# Patient Record
Sex: FEMALE | Race: BLACK OR AFRICAN AMERICAN | Employment: FULL TIME | URBAN - METROPOLITAN AREA
[De-identification: names, ages, dates, MRNs, and addresses within clinical notes are randomized per-mention and may not be internally consistent; named-entity substitution may affect disease eponyms.]

---

## 2017-04-28 RX ORDER — AMLODIPINE BESYLATE 5 MG/1
5 TABLET ORAL
Qty: 30 TABLET | Refills: 0 | Status: SHIPPED | OUTPATIENT
Start: 2017-04-28 | End: 2017-05-30

## 2017-04-28 RX ORDER — LOSARTAN POTASSIUM 25 MG/1
25 TABLET ORAL
Qty: 30 TABLET | Refills: 0 | Status: SHIPPED | OUTPATIENT
Start: 2017-04-28 | End: 2017-06-03

## 2017-04-28 NOTE — TELEPHONE ENCOUNTER
Please advise on refill request.  Unable to refill per protocol.    Hypertensive Medications  Protocol Criteria:  · Appointment scheduled in the past 6 months or in the next 3 months  · BMP or CMP in the past 12 months  · Creatinine result < 2  Recent Visit

## 2017-05-30 RX ORDER — AMLODIPINE BESYLATE 5 MG/1
5 TABLET ORAL
Qty: 30 TABLET | Refills: 1 | Status: SHIPPED | OUTPATIENT
Start: 2017-05-30 | End: 2017-05-31

## 2017-05-31 RX ORDER — AMLODIPINE BESYLATE 5 MG/1
TABLET ORAL
Qty: 90 TABLET | Refills: 1 | Status: SHIPPED | OUTPATIENT
Start: 2017-05-31 | End: 2018-07-05

## 2017-06-03 RX ORDER — LOSARTAN POTASSIUM 25 MG/1
25 TABLET ORAL
Qty: 30 TABLET | Refills: 5 | Status: SHIPPED | OUTPATIENT
Start: 2017-06-03 | End: 2018-02-15

## 2017-06-03 NOTE — TELEPHONE ENCOUNTER
Refill protocol failed, labs and appt out of range.   Follow up scheduled 07/20/17, JSK please advise on refill request.    Hypertensive Medications  Protocol Criteria:  · Appointment scheduled in the past 6 months or in the next 3 months  · BMP or CMP in t

## 2017-07-20 ENCOUNTER — OFFICE VISIT (OUTPATIENT)
Dept: INTERNAL MEDICINE CLINIC | Facility: CLINIC | Age: 47
End: 2017-07-20

## 2017-07-20 ENCOUNTER — OFFICE VISIT (OUTPATIENT)
Dept: OBGYN CLINIC | Facility: CLINIC | Age: 47
End: 2017-07-20

## 2017-07-20 VITALS
TEMPERATURE: 98 F | HEIGHT: 64 IN | RESPIRATION RATE: 20 BRPM | SYSTOLIC BLOOD PRESSURE: 148 MMHG | BODY MASS INDEX: 43.51 KG/M2 | HEART RATE: 78 BPM | DIASTOLIC BLOOD PRESSURE: 94 MMHG | WEIGHT: 254.88 LBS

## 2017-07-20 VITALS
BODY MASS INDEX: 42.32 KG/M2 | DIASTOLIC BLOOD PRESSURE: 90 MMHG | HEIGHT: 65 IN | HEART RATE: 71 BPM | WEIGHT: 254 LBS | SYSTOLIC BLOOD PRESSURE: 155 MMHG

## 2017-07-20 DIAGNOSIS — M79.673 HEEL PAIN, UNSPECIFIED LATERALITY: ICD-10-CM

## 2017-07-20 DIAGNOSIS — Z01.411 ENCOUNTER FOR GYNECOLOGICAL EXAMINATION WITH ABNORMAL FINDING: ICD-10-CM

## 2017-07-20 DIAGNOSIS — I10 ESSENTIAL HYPERTENSION: Primary | ICD-10-CM

## 2017-07-20 DIAGNOSIS — Z12.31 SCREENING MAMMOGRAM, ENCOUNTER FOR: ICD-10-CM

## 2017-07-20 DIAGNOSIS — Z11.3 SCREEN FOR STD (SEXUALLY TRANSMITTED DISEASE): Primary | ICD-10-CM

## 2017-07-20 PROCEDURE — 99214 OFFICE O/P EST MOD 30 MIN: CPT | Performed by: INTERNAL MEDICINE

## 2017-07-20 PROCEDURE — 99396 PREV VISIT EST AGE 40-64: CPT | Performed by: ADVANCED PRACTICE MIDWIFE

## 2017-07-20 PROCEDURE — 99212 OFFICE O/P EST SF 10 MIN: CPT | Performed by: INTERNAL MEDICINE

## 2017-07-20 NOTE — PROGRESS NOTES
HPI:   Charla Anne is a 48year old female who presents for annual exam.  Sees Dr. Dali Hayden for primary care, management of HTN,  Pt denies complaints except that periods are becoming more irregular. Last one was in April.   Not sexually active, rece exertion  CARDIOVASCULAR: denies chest pain on exertion  GI: denies abdominal pain,denies heartburn, denies constipation or diarrhea  : denies dysuria, pelvic pain, vaginal discharge or discomfort; periods regular, denies abnormally heaving bleeding; den for contraception until menopause, and patient is abstinent  Offered patient STI screening and she accepts  Labs drawn:  GC/CT, CBC, CMP, TSH with reflex to FT4, Vitamin D, lipid panel, HIV, RPR, HepB    Lennox Welch CNM

## 2017-07-20 NOTE — PROGRESS NOTES
HPI:    Patient ID: Shashank Murry is a 48year old female. HPI  Patient here for follow-up on chronic medical issues as listed below. Last seen in February 2016. Weight is down a few pounds at that time.   Chronically elevated alkaline phosphatase hematuria, vaginal discharge and sexual dysfunction. Musculoskeletal: Negative for joint pain. Skin: Negative for rash. Neurological: Negative for weakness, numbness and headaches. Hematological: Does not bruise/bleed easily.    Psychiatric/Behavior blood work and contact patient with results.    (Z68.41) BMI 40.0-44.9, [facility]  Plan: Discussed the importance of long-term commitment to diet, exercise, weight loss.    (M29.653) Heel pain, unspecified laterality  Plan: Likely plantar fasciitis.   Con

## 2017-07-21 LAB
C TRACH DNA SPEC QL NAA+PROBE: NEGATIVE
N GONORRHOEA DNA SPEC QL NAA+PROBE: NEGATIVE

## 2017-07-22 ENCOUNTER — TELEPHONE (OUTPATIENT)
Dept: OBGYN CLINIC | Facility: CLINIC | Age: 47
End: 2017-07-22

## 2017-07-22 NOTE — TELEPHONE ENCOUNTER
Lm for pt notifying her of neg cx & reminding her that she still has outstanding lab work that needs to be drawn

## 2017-08-15 RX ORDER — AMLODIPINE BESYLATE 5 MG/1
TABLET ORAL
Qty: 30 TABLET | Refills: 5 | Status: SHIPPED | OUTPATIENT
Start: 2017-08-15 | End: 2018-05-26

## 2017-08-25 ENCOUNTER — HOSPITAL ENCOUNTER (OUTPATIENT)
Dept: MAMMOGRAPHY | Facility: HOSPITAL | Age: 47
Discharge: HOME | End: 2017-08-25
Attending: ADVANCED PRACTICE MIDWIFE
Payer: COMMERCIAL

## 2017-08-25 DIAGNOSIS — Z12.31 SCREENING MAMMOGRAM, ENCOUNTER FOR: ICD-10-CM

## 2017-08-25 PROCEDURE — 77067 SCR MAMMO BI INCL CAD: CPT | Performed by: ADVANCED PRACTICE MIDWIFE

## 2017-08-31 ENCOUNTER — TELEPHONE (OUTPATIENT)
Dept: OBGYN CLINIC | Facility: CLINIC | Age: 47
End: 2017-08-31

## 2017-08-31 NOTE — TELEPHONE ENCOUNTER
Reminded pt that she still has outstanding lab work to complete. Pt verbalized an understanding & is planning to go to the OP lab to complete.  Pt verbalized an understanding & agrees w/ plan

## 2017-09-13 ENCOUNTER — LAB ENCOUNTER (OUTPATIENT)
Dept: LAB | Age: 47
End: 2017-09-13
Attending: ADVANCED PRACTICE MIDWIFE
Payer: COMMERCIAL

## 2017-09-13 DIAGNOSIS — I10 ESSENTIAL HYPERTENSION: ICD-10-CM

## 2017-09-13 DIAGNOSIS — Z01.411 ENCOUNTER FOR GYNECOLOGICAL EXAMINATION WITH ABNORMAL FINDING: ICD-10-CM

## 2017-09-13 DIAGNOSIS — Z11.3 SCREEN FOR STD (SEXUALLY TRANSMITTED DISEASE): ICD-10-CM

## 2017-09-13 LAB
25(OH)D3 SERPL-MCNC: 8.8 NG/ML
ALBUMIN SERPL BCP-MCNC: 3.4 G/DL (ref 3.5–4.8)
ALBUMIN/GLOB SERPL: 0.8 {RATIO} (ref 1–2)
ALP SERPL-CCNC: 116 U/L (ref 32–100)
ALT SERPL-CCNC: 12 U/L (ref 14–54)
ANION GAP SERPL CALC-SCNC: 10 MMOL/L (ref 0–18)
AST SERPL-CCNC: 15 U/L (ref 15–41)
BASOPHILS # BLD: 0.1 K/UL (ref 0–0.2)
BASOPHILS NFR BLD: 1 %
BILIRUB SERPL-MCNC: 0.3 MG/DL (ref 0.3–1.2)
BUN SERPL-MCNC: 10 MG/DL (ref 8–20)
BUN/CREAT SERPL: 12.5 (ref 10–20)
CALCIUM SERPL-MCNC: 9.2 MG/DL (ref 8.5–10.5)
CHLORIDE SERPL-SCNC: 105 MMOL/L (ref 95–110)
CHOLEST SERPL-MCNC: 181 MG/DL (ref 110–200)
CO2 SERPL-SCNC: 24 MMOL/L (ref 22–32)
CREAT SERPL-MCNC: 0.8 MG/DL (ref 0.5–1.5)
EOSINOPHIL # BLD: 0.1 K/UL (ref 0–0.7)
EOSINOPHIL NFR BLD: 2 %
ERYTHROCYTE [DISTWIDTH] IN BLOOD BY AUTOMATED COUNT: 14.6 % (ref 11–15)
GLOBULIN PLAS-MCNC: 4.4 G/DL (ref 2.5–3.7)
GLUCOSE SERPL-MCNC: 80 MG/DL (ref 70–99)
HCT VFR BLD AUTO: 36.4 % (ref 35–48)
HDLC SERPL-MCNC: 35 MG/DL
HGB BLD-MCNC: 11.7 G/DL (ref 12–16)
LDLC SERPL CALC-MCNC: 128 MG/DL (ref 0–99)
LYMPHOCYTES # BLD: 1.8 K/UL (ref 1–4)
LYMPHOCYTES NFR BLD: 32 %
MCH RBC QN AUTO: 26.8 PG (ref 27–32)
MCHC RBC AUTO-ENTMCNC: 32 G/DL (ref 32–37)
MCV RBC AUTO: 83.6 FL (ref 80–100)
MONOCYTES # BLD: 0.5 K/UL (ref 0–1)
MONOCYTES NFR BLD: 10 %
NEUTROPHILS # BLD AUTO: 3.1 K/UL (ref 1.8–7.7)
NEUTROPHILS NFR BLD: 55 %
NONHDLC SERPL-MCNC: 146 MG/DL
OSMOLALITY UR CALC.SUM OF ELEC: 286 MOSM/KG (ref 275–295)
PLATELET # BLD AUTO: 311 K/UL (ref 140–400)
PMV BLD AUTO: 8.7 FL (ref 7.4–10.3)
POTASSIUM SERPL-SCNC: 3.8 MMOL/L (ref 3.3–5.1)
PROT SERPL-MCNC: 7.8 G/DL (ref 5.9–8.4)
RBC # BLD AUTO: 4.36 M/UL (ref 3.7–5.4)
SODIUM SERPL-SCNC: 139 MMOL/L (ref 136–144)
T PALLIDUM AB SER QL: NEGATIVE
TRIGL SERPL-MCNC: 90 MG/DL (ref 1–149)
TSH SERPL-ACNC: 1.35 UIU/ML (ref 0.45–5.33)
VIT B12 SERPL-MCNC: 303 PG/ML (ref 181–914)
WBC # BLD AUTO: 5.6 K/UL (ref 4–11)

## 2017-09-13 PROCEDURE — 36415 COLL VENOUS BLD VENIPUNCTURE: CPT

## 2017-09-13 PROCEDURE — 80061 LIPID PANEL: CPT

## 2017-09-13 PROCEDURE — 84443 ASSAY THYROID STIM HORMONE: CPT

## 2017-09-13 PROCEDURE — 80053 COMPREHEN METABOLIC PANEL: CPT

## 2017-09-13 PROCEDURE — 87389 HIV-1 AG W/HIV-1&-2 AB AG IA: CPT

## 2017-09-13 PROCEDURE — 87340 HEPATITIS B SURFACE AG IA: CPT

## 2017-09-13 PROCEDURE — 82306 VITAMIN D 25 HYDROXY: CPT

## 2017-09-13 PROCEDURE — 86780 TREPONEMA PALLIDUM: CPT

## 2017-09-13 PROCEDURE — 85025 COMPLETE CBC W/AUTO DIFF WBC: CPT

## 2017-09-13 PROCEDURE — 82607 VITAMIN B-12: CPT

## 2017-09-14 LAB
HBV SURFACE AG SERPL QL IA: NONREACTIVE
HIV1+2 AB SERPL QL IA: NONREACTIVE

## 2017-09-15 ENCOUNTER — TELEPHONE (OUTPATIENT)
Dept: OBGYN CLINIC | Facility: CLINIC | Age: 47
End: 2017-09-15

## 2017-09-15 NOTE — TELEPHONE ENCOUNTER
The pt is returning a nurse's call, and states that a detailed v/m can be left at [phone]. Please advise.

## 2017-09-25 ENCOUNTER — APPOINTMENT (OUTPATIENT)
Dept: LAB | Age: 47
End: 2017-09-25
Attending: INTERNAL MEDICINE
Payer: COMMERCIAL

## 2017-09-25 DIAGNOSIS — E55.9 VITAMIN D DEFICIENCY: ICD-10-CM

## 2017-09-25 LAB
ALBUMIN SERPL BCP-MCNC: 3.4 G/DL (ref 3.5–4.8)
ALBUMIN/GLOB SERPL: 0.8 {RATIO} (ref 1–2)
ALP SERPL-CCNC: 112 U/L (ref 32–100)
ALT SERPL-CCNC: 13 U/L (ref 14–54)
ANION GAP SERPL CALC-SCNC: 9 MMOL/L (ref 0–18)
AST SERPL-CCNC: 16 U/L (ref 15–41)
BILIRUB SERPL-MCNC: 0.5 MG/DL (ref 0.3–1.2)
BUN SERPL-MCNC: 9 MG/DL (ref 8–20)
BUN/CREAT SERPL: 11.8 (ref 10–20)
CALCIUM SERPL-MCNC: 9.1 MG/DL (ref 8.5–10.5)
CHLORIDE SERPL-SCNC: 105 MMOL/L (ref 95–110)
CO2 SERPL-SCNC: 23 MMOL/L (ref 22–32)
CREAT SERPL-MCNC: 0.76 MG/DL (ref 0.5–1.5)
GLOBULIN PLAS-MCNC: 4.4 G/DL (ref 2.5–3.7)
GLUCOSE SERPL-MCNC: 85 MG/DL (ref 70–99)
OSMOLALITY UR CALC.SUM OF ELEC: 282 MOSM/KG (ref 275–295)
POTASSIUM SERPL-SCNC: 3.9 MMOL/L (ref 3.3–5.1)
PROT SERPL-MCNC: 7.8 G/DL (ref 5.9–8.4)
PTH-INTACT SERPL-MCNC: 45.2 PG/ML (ref 12–88)
SODIUM SERPL-SCNC: 137 MMOL/L (ref 136–144)

## 2017-09-25 PROCEDURE — 83970 ASSAY OF PARATHORMONE: CPT

## 2017-09-25 PROCEDURE — 36415 COLL VENOUS BLD VENIPUNCTURE: CPT

## 2017-09-25 PROCEDURE — 80053 COMPREHEN METABOLIC PANEL: CPT

## 2017-11-14 DIAGNOSIS — E55.9 VITAMIN D DEFICIENCY: Primary | ICD-10-CM

## 2018-02-16 RX ORDER — LOSARTAN POTASSIUM 25 MG/1
TABLET ORAL
Qty: 30 TABLET | Refills: 1 | Status: SHIPPED | OUTPATIENT
Start: 2018-02-16 | End: 2018-07-05

## 2018-04-30 RX ORDER — LOSARTAN POTASSIUM 25 MG/1
TABLET ORAL
Qty: 30 TABLET | Refills: 1 | Status: SHIPPED | OUTPATIENT
Start: 2018-04-30 | End: 2018-08-03

## 2018-04-30 NOTE — TELEPHONE ENCOUNTER
Please assist patient scheduling appointment with Dr Luis Daniel Aleman prior to additional refills.  Thanks

## 2018-05-26 RX ORDER — AMLODIPINE BESYLATE 5 MG/1
TABLET ORAL
Qty: 90 TABLET | Refills: 0 | Status: SHIPPED | OUTPATIENT
Start: 2018-05-26 | End: 2018-08-03

## 2018-05-26 NOTE — TELEPHONE ENCOUNTER
Pending Prescriptions Disp Refills    AMLODIPINE BESYLATE 5 MG Oral Tab [Pharmacy Med Name: AMLODIPINE BESYLATE 5MG TABLETS] 90 tablet 0     Sig: TAKE 1 TABLET BY MOUTH EVERY DAY           Refill approved per protocol.   Hypertensive Medications  Protocol

## 2018-06-18 ENCOUNTER — PATIENT OUTREACH (OUTPATIENT)
Dept: CASE MANAGEMENT | Age: 48
End: 2018-06-18

## 2018-06-18 NOTE — PROGRESS NOTES
Patient is due for hypertension follow up appointment. Discussed in detail w/patient. Appt scheduled for 7/5/18.

## 2018-07-05 RX ORDER — LOSARTAN POTASSIUM 25 MG/1
TABLET ORAL
Qty: 30 TABLET | Refills: 1 | Status: SHIPPED | OUTPATIENT
Start: 2018-07-05 | End: 2018-08-03

## 2018-07-05 NOTE — TELEPHONE ENCOUNTER
Please contact patient and assist in scheduling follow up appointment with patient Dr Akilah Harris prior to additional refills.

## 2018-08-02 ENCOUNTER — OFFICE VISIT (OUTPATIENT)
Dept: OBGYN CLINIC | Facility: CLINIC | Age: 48
End: 2018-08-02

## 2018-08-02 VITALS
DIASTOLIC BLOOD PRESSURE: 78 MMHG | BODY MASS INDEX: 42.68 KG/M2 | SYSTOLIC BLOOD PRESSURE: 124 MMHG | HEART RATE: 80 BPM | WEIGHT: 250 LBS | HEIGHT: 64 IN

## 2018-08-02 DIAGNOSIS — Z01.419 ENCOUNTER FOR GYNECOLOGICAL EXAMINATION WITHOUT ABNORMAL FINDING: Primary | ICD-10-CM

## 2018-08-02 DIAGNOSIS — Z12.4 SCREENING FOR CERVICAL CANCER: ICD-10-CM

## 2018-08-02 PROCEDURE — 99396 PREV VISIT EST AGE 40-64: CPT | Performed by: ADVANCED PRACTICE MIDWIFE

## 2018-08-03 ENCOUNTER — OFFICE VISIT (OUTPATIENT)
Dept: INTERNAL MEDICINE CLINIC | Facility: CLINIC | Age: 48
End: 2018-08-03

## 2018-08-03 VITALS
WEIGHT: 249.88 LBS | BODY MASS INDEX: 42.66 KG/M2 | DIASTOLIC BLOOD PRESSURE: 83 MMHG | HEART RATE: 77 BPM | SYSTOLIC BLOOD PRESSURE: 123 MMHG | HEIGHT: 64 IN

## 2018-08-03 DIAGNOSIS — E55.9 VITAMIN D DEFICIENCY: ICD-10-CM

## 2018-08-03 DIAGNOSIS — I10 ESSENTIAL HYPERTENSION: Primary | ICD-10-CM

## 2018-08-03 LAB — HPV I/H RISK 1 DNA SPEC QL NAA+PROBE: NEGATIVE

## 2018-08-03 PROCEDURE — 99214 OFFICE O/P EST MOD 30 MIN: CPT | Performed by: INTERNAL MEDICINE

## 2018-08-03 PROCEDURE — 99212 OFFICE O/P EST SF 10 MIN: CPT | Performed by: INTERNAL MEDICINE

## 2018-08-03 RX ORDER — AMLODIPINE BESYLATE 5 MG/1
5 TABLET ORAL
Qty: 90 TABLET | Refills: 1 | Status: SHIPPED | OUTPATIENT
Start: 2018-08-03 | End: 2020-07-21 | Stop reason: DRUGHIGH

## 2018-08-03 RX ORDER — LOSARTAN POTASSIUM 25 MG/1
25 TABLET ORAL
Qty: 90 TABLET | Refills: 1 | Status: SHIPPED | OUTPATIENT
Start: 2018-08-03 | End: 2020-07-21

## 2018-08-03 NOTE — PROGRESS NOTES
HPI:    Patient ID: Renny Lu is a 50year old female. HPI  Patient is here for follow-up on chronic medical issues as listed below. Last seen here about a year ago. At that time we resumed losartan 25 mg a day.   Continued with Norvasc dosing joint pain. Skin: Negative for rash. Neurological: Negative for weakness, numbness and headaches. Hematological: Does not bruise/bleed easily. Psychiatric/Behavioral: Negative for depressed mood. The patient is not nervous/anxious.              Josué Medeiros Continue with diet and exercise.    (E55.9) Vitamin D deficiency  Plan: VITAMIN D, 25-HYDROXY         Check vitamin D level. Continue with current supplementation. May need to change pending results.       Meds This Visit:  Pending Prescriptions Disp Refi

## 2018-08-06 NOTE — PROGRESS NOTES
HPI:   Mirela Batista is a 50year old female who presents for annual exam.  Reports doing well. Still living with daughter. No sexual activity since last visit with me. Declines STI screening.   Has appt scheduled with Dr. Jm Torres scheduled soon an OTHER SURGICAL HISTORY      Comment: Root Canal Extraction   Family History   Problem Relation Age of Onset   • Pulmonary Disease Mother      COPD   • Hypertension Maternal Grandmother       Social History:   Smoking status: Never Smoker three    ASSESSMENT AND PLAN:     Eloy Edmond is a 50year old female with normal GYN exam  Morbid obesity  Due for screening mammogram   -RX placed    Reviewed PAP guidelines recommending q 5 year screening with HPV testing.    Discussed breast awar

## 2018-08-07 LAB — LAST PAP RESULT: NORMAL

## 2018-08-28 ENCOUNTER — HOSPITAL ENCOUNTER (OUTPATIENT)
Dept: MAMMOGRAPHY | Age: 48
Discharge: HOME | End: 2018-08-28
Attending: ADVANCED PRACTICE MIDWIFE
Payer: COMMERCIAL

## 2018-08-28 DIAGNOSIS — Z01.419 ENCOUNTER FOR GYNECOLOGICAL EXAMINATION WITHOUT ABNORMAL FINDING: ICD-10-CM

## 2018-08-28 PROCEDURE — 77067 SCR MAMMO BI INCL CAD: CPT | Performed by: ADVANCED PRACTICE MIDWIFE

## 2018-08-28 PROCEDURE — 77063 BREAST TOMOSYNTHESIS BI: CPT | Performed by: ADVANCED PRACTICE MIDWIFE

## 2018-09-02 RX ORDER — AMLODIPINE BESYLATE 5 MG/1
TABLET ORAL
Qty: 30 TABLET | Refills: 2 | Status: SHIPPED | OUTPATIENT
Start: 2018-09-02 | End: 2020-07-21 | Stop reason: DRUGHIGH

## 2019-01-07 ENCOUNTER — TELEPHONE (OUTPATIENT)
Dept: INTERNAL MEDICINE CLINIC | Facility: CLINIC | Age: 49
End: 2019-01-07

## 2019-01-07 DIAGNOSIS — Z12.11 COLON CANCER SCREENING: Primary | ICD-10-CM

## 2019-01-08 NOTE — TELEPHONE ENCOUNTER
Pt is requesting an order for colonoscopy and FIT colorectal screening via [employer].  Please advise

## 2019-01-09 ENCOUNTER — LAB ENCOUNTER (OUTPATIENT)
Dept: LAB | Age: 49
End: 2019-01-09
Attending: INTERNAL MEDICINE
Payer: COMMERCIAL

## 2019-01-09 DIAGNOSIS — E55.9 VITAMIN D DEFICIENCY: ICD-10-CM

## 2019-01-09 DIAGNOSIS — I10 ESSENTIAL HYPERTENSION: ICD-10-CM

## 2019-01-09 LAB
ALBUMIN SERPL BCP-MCNC: 3.4 G/DL (ref 3.5–4.8)
ALBUMIN/GLOB SERPL: 0.7 {RATIO} (ref 1–2)
ALP SERPL-CCNC: 118 U/L (ref 32–100)
ALT SERPL-CCNC: 15 U/L (ref 14–54)
ANION GAP SERPL CALC-SCNC: 10 MMOL/L (ref 0–18)
AST SERPL-CCNC: 18 U/L (ref 15–41)
BASOPHILS # BLD: 0 K/UL (ref 0–0.2)
BASOPHILS NFR BLD: 1 %
BILIRUB SERPL-MCNC: 0.6 MG/DL (ref 0.3–1.2)
BUN SERPL-MCNC: 7 MG/DL (ref 8–20)
BUN/CREAT SERPL: 8.8 (ref 10–20)
CALCIUM SERPL-MCNC: 9.5 MG/DL (ref 8.5–10.5)
CHLORIDE SERPL-SCNC: 103 MMOL/L (ref 95–110)
CHOLEST SERPL-MCNC: 202 MG/DL (ref 110–200)
CO2 SERPL-SCNC: 24 MMOL/L (ref 22–32)
CREAT SERPL-MCNC: 0.8 MG/DL (ref 0.5–1.5)
EOSINOPHIL # BLD: 0.1 K/UL (ref 0–0.7)
EOSINOPHIL NFR BLD: 2 %
ERYTHROCYTE [DISTWIDTH] IN BLOOD BY AUTOMATED COUNT: 14.4 % (ref 11–15)
GLOBULIN PLAS-MCNC: 4.9 G/DL (ref 2.5–3.7)
GLUCOSE SERPL-MCNC: 85 MG/DL (ref 70–99)
HCT VFR BLD AUTO: 37.5 % (ref 35–48)
HDLC SERPL-MCNC: 39 MG/DL
HGB BLD-MCNC: 12 G/DL (ref 12–16)
LDLC SERPL CALC-MCNC: 152 MG/DL (ref 0–99)
LYMPHOCYTES # BLD: 2.3 K/UL (ref 1–4)
LYMPHOCYTES NFR BLD: 36 %
MCH RBC QN AUTO: 27 PG (ref 27–32)
MCHC RBC AUTO-ENTMCNC: 31.9 G/DL (ref 32–37)
MCV RBC AUTO: 84.5 FL (ref 80–100)
MONOCYTES # BLD: 0.6 K/UL (ref 0–1)
MONOCYTES NFR BLD: 9 %
NEUTROPHILS # BLD AUTO: 3.4 K/UL (ref 1.8–7.7)
NEUTROPHILS NFR BLD: 52 %
NONHDLC SERPL-MCNC: 163 MG/DL
OSMOLALITY UR CALC.SUM OF ELEC: 281 MOSM/KG (ref 275–295)
PATIENT FASTING: YES
PLATELET # BLD AUTO: 338 K/UL (ref 140–400)
PMV BLD AUTO: 8.6 FL (ref 7.4–10.3)
POTASSIUM SERPL-SCNC: 3.8 MMOL/L (ref 3.3–5.1)
PROT SERPL-MCNC: 8.3 G/DL (ref 5.9–8.4)
RBC # BLD AUTO: 4.44 M/UL (ref 3.7–5.4)
SODIUM SERPL-SCNC: 137 MMOL/L (ref 136–144)
TRIGL SERPL-MCNC: 56 MG/DL (ref 1–149)
TSH SERPL-ACNC: 2.43 UIU/ML (ref 0.45–5.33)
WBC # BLD AUTO: 6.5 K/UL (ref 4–11)

## 2019-01-09 PROCEDURE — 82306 VITAMIN D 25 HYDROXY: CPT

## 2019-01-09 PROCEDURE — 84443 ASSAY THYROID STIM HORMONE: CPT

## 2019-01-09 PROCEDURE — 80053 COMPREHEN METABOLIC PANEL: CPT

## 2019-01-09 PROCEDURE — 36415 COLL VENOUS BLD VENIPUNCTURE: CPT

## 2019-01-09 PROCEDURE — 85025 COMPLETE CBC W/AUTO DIFF WBC: CPT

## 2019-01-09 PROCEDURE — 80061 LIPID PANEL: CPT

## 2019-01-09 NOTE — TELEPHONE ENCOUNTER
Call pt. Referral signed and sent to [facility]- care for colonoscopy.     Please call [phone] to schedule a telephone screening appointment prior to your procedure

## 2019-01-11 LAB — 25(OH)D3 SERPL-MCNC: 37.9 NG/ML (ref 30–100)

## 2019-08-15 ENCOUNTER — OFFICE VISIT (OUTPATIENT)
Dept: OBGYN CLINIC | Facility: CLINIC | Age: 49
End: 2019-08-15

## 2019-08-15 VITALS
HEART RATE: 98 BPM | DIASTOLIC BLOOD PRESSURE: 100 MMHG | HEIGHT: 64 IN | SYSTOLIC BLOOD PRESSURE: 140 MMHG | WEIGHT: 256.13 LBS | BODY MASS INDEX: 43.73 KG/M2

## 2019-08-15 DIAGNOSIS — Z01.411 ENCOUNTER FOR GYNECOLOGICAL EXAMINATION WITH ABNORMAL FINDING: Primary | ICD-10-CM

## 2019-08-15 DIAGNOSIS — E55.9 VITAMIN D DEFICIENCY: ICD-10-CM

## 2019-08-15 DIAGNOSIS — E66.01 CLASS 3 SEVERE OBESITY WITHOUT SERIOUS COMORBIDITY WITH BODY MASS INDEX (BMI) OF 45.0 TO 49.9 IN ADULT, UNSPECIFIED OBESITY TYPE (HCC): ICD-10-CM

## 2019-08-15 DIAGNOSIS — N95.1 PERIMENOPAUSAL: ICD-10-CM

## 2019-08-15 PROCEDURE — 99396 PREV VISIT EST AGE 40-64: CPT | Performed by: ADVANCED PRACTICE MIDWIFE

## 2019-08-15 NOTE — PROGRESS NOTES
HPI:   Deetta Scheuermann is a 46year old female who presents for annual well-woman exam.  No complaints. Has been under more stress due to mother's recent illness and work, not taking care of herself like she knows she should.   Denies depression or need tablet (25 mg total) by mouth once daily.  Disp: 90 tablet Rfl: 1   AMLODIPINE BESYLATE 5 MG Oral Tab TAKE 1 TABLET(5 MG) BY MOUTH EVERY DAY Disp: 30 tablet Rfl: 2      Past Medical History:   Diagnosis Date   • Essential hypertension    • Fibroids 2006 Cervix: normal os, no lesions or bleeding         Bimanual: difficult to palpate uterus or adnexae due to habitus  EXTREMITIES: no cyanosis, clubbing or edema  NEURO: Oriented times three    ASSESSMENT AND PLAN:     Sydnee Thurston

## 2019-08-28 ENCOUNTER — OFFICE VISIT (OUTPATIENT)
Dept: INTERNAL MEDICINE CLINIC | Facility: CLINIC | Age: 49
End: 2019-08-28

## 2019-08-28 VITALS
DIASTOLIC BLOOD PRESSURE: 80 MMHG | HEART RATE: 94 BPM | HEIGHT: 64 IN | WEIGHT: 255 LBS | SYSTOLIC BLOOD PRESSURE: 130 MMHG | BODY MASS INDEX: 43.54 KG/M2

## 2019-08-28 DIAGNOSIS — E66.01 CLASS 3 SEVERE OBESITY DUE TO EXCESS CALORIES WITH SERIOUS COMORBIDITY AND BODY MASS INDEX (BMI) OF 40.0 TO 44.9 IN ADULT (HCC): ICD-10-CM

## 2019-08-28 DIAGNOSIS — I10 ESSENTIAL HYPERTENSION: Primary | ICD-10-CM

## 2019-08-28 PROCEDURE — 99213 OFFICE O/P EST LOW 20 MIN: CPT | Performed by: NURSE PRACTITIONER

## 2019-08-28 RX ORDER — AMLODIPINE BESYLATE 10 MG/1
10 TABLET ORAL DAILY
Qty: 30 TABLET | Refills: 3 | Status: SHIPPED | OUTPATIENT
Start: 2019-08-28 | End: 2019-12-15

## 2019-08-28 NOTE — PATIENT INSTRUCTIONS
Low-Salt Diet  This diet removes foods that are high in salt. It also limits the amount of salt you use when cooking. It is most often used for people with high blood pressure, edema (fluid retention), and kidney, liver, or heart disease.   Table salt con Avoid: Flavored coffees, electrolyte replacement drinks, sports drinks  Meats  Ok: All fresh meat, fish, poultry, low-salt tuna, eggs, egg substitute  Avoid: Smoked, pickled, brine-cured, or salted meats and fish.  This includes fritz, chipped beef, corned

## 2019-08-28 NOTE — ASSESSMENT & PLAN NOTE
A/P patient with obesity with a BMI of 43. He has had increased stress in her life her mother was diagnosed with cancer. Is eating on the run fast food diet. And given list of foods to buy at the grocery store.   1) Follow Plant Protein Diet  2) Follow l

## 2019-08-28 NOTE — ASSESSMENT & PLAN NOTE
A/P 25-year-old -American female who is obese with a BMI of 43 is here for hypertension. He has had a lot of stress in her life over the last couple months her mother was diagnosed with cervical cancer.   She stopped her losartan due to the warnings

## 2019-08-28 NOTE — PROGRESS NOTES
HPI:    Patient ID: Constanza Bartlett is a 46year old female. HPI-year-old 46 can American, female here for concerns of elevated blood pressure. Takes her blood pressure at home and she noticed her diastolic blood pressure increasing above 90.   She d Oropharynx is clear and moist.   Eyes: Pupils are equal, round, and reactive to light. Cardiovascular: Normal rate, regular rhythm, normal heart sounds and intact distal pulses. Exam reveals no gallop and no friction rub. No murmur heard.   Pulmonary/ Plant Protein Diet  2) Follow low sodium diet                  Return in about 2 weeks (around 9/11/2019). No orders of the defined types were placed in this encounter.       Meds This Visit:  Requested Prescriptions     Signed Prescriptions Disp Refills

## 2019-09-16 ENCOUNTER — TELEPHONE (OUTPATIENT)
Dept: OBGYN CLINIC | Facility: CLINIC | Age: 49
End: 2019-09-16

## 2019-09-16 NOTE — TELEPHONE ENCOUNTER
Spoke with pt and advised of overdue lab order. Pt agreed and voiced understanding and will stop by the lab tomorrow when she goes in for mammogram appt.

## 2019-09-17 ENCOUNTER — HOSPITAL ENCOUNTER (OUTPATIENT)
Dept: MAMMOGRAPHY | Age: 49
Discharge: HOME | End: 2019-09-17
Attending: ADVANCED PRACTICE MIDWIFE
Payer: COMMERCIAL

## 2019-09-17 ENCOUNTER — APPOINTMENT (OUTPATIENT)
Dept: LAB | Age: 49
End: 2019-09-17
Attending: ADVANCED PRACTICE MIDWIFE
Payer: COMMERCIAL

## 2019-09-17 DIAGNOSIS — E55.9 VITAMIN D DEFICIENCY: ICD-10-CM

## 2019-09-17 DIAGNOSIS — Z01.411 ENCOUNTER FOR GYNECOLOGICAL EXAMINATION WITH ABNORMAL FINDING: ICD-10-CM

## 2019-09-17 DIAGNOSIS — E66.01 CLASS 3 SEVERE OBESITY WITHOUT SERIOUS COMORBIDITY WITH BODY MASS INDEX (BMI) OF 45.0 TO 49.9 IN ADULT, UNSPECIFIED OBESITY TYPE (HCC): ICD-10-CM

## 2019-09-17 PROCEDURE — 36415 COLL VENOUS BLD VENIPUNCTURE: CPT

## 2019-09-17 PROCEDURE — 82306 VITAMIN D 25 HYDROXY: CPT

## 2019-09-17 PROCEDURE — 77067 SCR MAMMO BI INCL CAD: CPT | Performed by: ADVANCED PRACTICE MIDWIFE

## 2019-09-17 PROCEDURE — 77063 BREAST TOMOSYNTHESIS BI: CPT | Performed by: ADVANCED PRACTICE MIDWIFE

## 2019-09-18 LAB — 25(OH)D3 SERPL-MCNC: 29.2 NG/ML (ref 30–100)

## 2019-12-15 DIAGNOSIS — I10 ESSENTIAL HYPERTENSION: ICD-10-CM

## 2019-12-16 RX ORDER — AMLODIPINE BESYLATE 10 MG/1
10 TABLET ORAL DAILY
Qty: 90 TABLET | Refills: 0 | Status: SHIPPED | OUTPATIENT
Start: 2019-12-16 | End: 2020-03-13

## 2019-12-16 NOTE — TELEPHONE ENCOUNTER
To reception staff, pls call pt for appt. Also [employer] message sent to pt.         Hypertensive Medications  Protocol Criteria:  · Appointment scheduled in the past 6 months or in the next 3 months  · BMP or CMP in the past 12 months  · Creatinine result

## 2020-01-23 ENCOUNTER — TELEPHONE (OUTPATIENT)
Dept: INTERNAL MEDICINE CLINIC | Facility: CLINIC | Age: 50
End: 2020-01-23

## 2020-01-23 DIAGNOSIS — Z12.11 COLON CANCER SCREENING: Primary | ICD-10-CM

## 2020-03-04 ENCOUNTER — PATIENT MESSAGE (OUTPATIENT)
Dept: INTERNAL MEDICINE CLINIC | Facility: CLINIC | Age: 50
End: 2020-03-04

## 2020-03-05 NOTE — TELEPHONE ENCOUNTER
From: Anna Marie De Jesus  To: Panda Fuentes MD  Sent: 3/4/2020 8:36 PM CST  Subject: Referral Request    I was waiting for the office to let me know if I was approved for a colonoscopy referral.

## 2020-03-12 DIAGNOSIS — I10 ESSENTIAL HYPERTENSION: ICD-10-CM

## 2020-03-13 ENCOUNTER — NURSE TRIAGE (OUTPATIENT)
Dept: INTERNAL MEDICINE CLINIC | Facility: CLINIC | Age: 50
End: 2020-03-13

## 2020-03-13 RX ORDER — AMLODIPINE BESYLATE 10 MG/1
TABLET ORAL
Qty: 90 TABLET | Refills: 0 | Status: SHIPPED | OUTPATIENT
Start: 2020-03-13 | End: 2020-06-12

## 2020-03-13 NOTE — TELEPHONE ENCOUNTER
Pt has appt 3/16(Monday) for Dr. Marlon Beltran. In travel screen pt reported new abdominal pain and weakness. No travel, no exposure. Appt for medication but triaging pt because pain/weakness. Please advise.

## 2020-03-13 NOTE — TELEPHONE ENCOUNTER
To reception staff, pls call pt for appt. Also [employer] message sent to pt     Please review; protocol failed.      Requested Prescriptions     Pending Prescriptions Disp Refills   • AMLODIPINE BESYLATE 10 MG Oral Tab [Pharmacy Med Name: AMLODIPINE BESYLAT

## 2020-03-13 NOTE — TELEPHONE ENCOUNTER
Action Requested: Summary for Provider     []  Critical Lab, Recommendations Needed  [] Need Additional Advice  []   FYI    []   Need Orders  [] Need Medications Sent to Pharmacy  []  Other     SUMMARY: Patient c/o intermittent upper middle abdominal pain

## 2020-06-08 ENCOUNTER — E-VISIT (OUTPATIENT)
Dept: FAMILY MEDICINE CLINIC | Facility: CLINIC | Age: 50
End: 2020-06-08

## 2020-06-08 DIAGNOSIS — J01.90 ACUTE SINUSITIS, RECURRENCE NOT SPECIFIED, UNSPECIFIED LOCATION: Primary | ICD-10-CM

## 2020-06-08 DIAGNOSIS — J40 BRONCHITIS: ICD-10-CM

## 2020-06-08 PROCEDURE — 99421 OL DIG E/M SVC 5-10 MIN: CPT | Performed by: NURSE PRACTITIONER

## 2020-06-08 RX ORDER — FLUTICASONE PROPIONATE 50 MCG
2 SPRAY, SUSPENSION (ML) NASAL DAILY
Qty: 1 INHALER | Refills: 0 | Status: SHIPPED | OUTPATIENT
Start: 2020-06-08

## 2020-06-08 RX ORDER — AMOXICILLIN AND CLAVULANATE POTASSIUM 875; 125 MG/1; MG/1
1 TABLET, FILM COATED ORAL 2 TIMES DAILY
Qty: 20 TABLET | Refills: 0 | Status: SHIPPED | OUTPATIENT
Start: 2020-06-08 | End: 2020-06-18

## 2020-06-08 RX ORDER — BENZONATATE 200 MG/1
200 CAPSULE ORAL 3 TIMES DAILY PRN
Qty: 20 CAPSULE | Refills: 0 | Status: SHIPPED | OUTPATIENT
Start: 2020-06-08 | End: 2021-03-25 | Stop reason: ALTCHOICE

## 2020-06-08 RX ORDER — ALBUTEROL SULFATE 90 UG/1
AEROSOL, METERED RESPIRATORY (INHALATION)
Qty: 1 INHALER | Refills: 0 | Status: SHIPPED | OUTPATIENT
Start: 2020-06-08

## 2020-06-08 NOTE — PROGRESS NOTES
Patient  elected an E-visit. After reviewing history, symptoms, telephone conversation and Prescriptions 9 minutes of time was accrued.   Please review E-Visit for further information,

## 2020-06-11 ENCOUNTER — TELEPHONE (OUTPATIENT)
Dept: INTERNAL MEDICINE CLINIC | Facility: CLINIC | Age: 50
End: 2020-06-11

## 2020-06-11 DIAGNOSIS — I10 ESSENTIAL HYPERTENSION: ICD-10-CM

## 2020-06-12 RX ORDER — AMLODIPINE BESYLATE 10 MG/1
10 TABLET ORAL DAILY
Qty: 90 TABLET | Refills: 0 | Status: SHIPPED | OUTPATIENT
Start: 2020-06-12 | End: 2020-09-08

## 2020-08-17 ENCOUNTER — PATIENT MESSAGE (OUTPATIENT)
Dept: INTERNAL MEDICINE CLINIC | Facility: CLINIC | Age: 50
End: 2020-08-17

## 2020-08-17 NOTE — TELEPHONE ENCOUNTER
From: Moira Phalen  To: Shayne Bamberger, MD  Sent: 8/17/2020 10:21 AM CDT  Subject: Referral Request    I need a referral for a breast exam. Thank you

## 2020-08-20 NOTE — TELEPHONE ENCOUNTER
Please contact patient and schedule an appointment for a breast exam with Valeriano Young or Dr. Sabrina Bernard  Working with Dr. Donnie Christensen

## 2020-08-20 NOTE — TELEPHONE ENCOUNTER
Please contact patient and schedule an appointment for a breast exam with Hien Field or Dr. Sadie Dwyer  Working with Dr. Kimberly Hung

## 2020-08-26 ENCOUNTER — LAB ENCOUNTER (OUTPATIENT)
Dept: LAB | Facility: HOSPITAL | Age: 50
End: 2020-08-26
Attending: NURSE PRACTITIONER
Payer: COMMERCIAL

## 2020-08-26 ENCOUNTER — OFFICE VISIT (OUTPATIENT)
Dept: INTERNAL MEDICINE CLINIC | Facility: CLINIC | Age: 50
End: 2020-08-26

## 2020-08-26 VITALS
DIASTOLIC BLOOD PRESSURE: 83 MMHG | WEIGHT: 255 LBS | HEIGHT: 64 IN | HEART RATE: 106 BPM | SYSTOLIC BLOOD PRESSURE: 131 MMHG | BODY MASS INDEX: 43.54 KG/M2 | RESPIRATION RATE: 16 BRPM

## 2020-08-26 DIAGNOSIS — Z12.31 ENCOUNTER FOR SCREENING MAMMOGRAM FOR MALIGNANT NEOPLASM OF BREAST: ICD-10-CM

## 2020-08-26 DIAGNOSIS — E66.01 CLASS 3 SEVERE OBESITY DUE TO EXCESS CALORIES WITH SERIOUS COMORBIDITY AND BODY MASS INDEX (BMI) OF 40.0 TO 44.9 IN ADULT (HCC): ICD-10-CM

## 2020-08-26 DIAGNOSIS — Z12.11 SCREENING FOR COLON CANCER: ICD-10-CM

## 2020-08-26 DIAGNOSIS — Z00.00 ANNUAL PHYSICAL EXAM: ICD-10-CM

## 2020-08-26 DIAGNOSIS — Z12.11 SCREENING FOR COLON CANCER: Primary | ICD-10-CM

## 2020-08-26 DIAGNOSIS — I10 ESSENTIAL HYPERTENSION: ICD-10-CM

## 2020-08-26 DIAGNOSIS — E55.9 VITAMIN D DEFICIENCY: ICD-10-CM

## 2020-08-26 DIAGNOSIS — N91.2 AMENORRHEA: ICD-10-CM

## 2020-08-26 LAB
ALBUMIN SERPL-MCNC: 3.3 G/DL (ref 3.4–5)
ALBUMIN/GLOB SERPL: 0.5 {RATIO} (ref 1–2)
ALP LIVER SERPL-CCNC: 148 U/L (ref 41–108)
ALT SERPL-CCNC: 17 U/L (ref 13–56)
ANION GAP SERPL CALC-SCNC: 4 MMOL/L (ref 0–18)
AST SERPL-CCNC: 15 U/L (ref 15–37)
BASOPHILS # BLD AUTO: 0.07 X10(3) UL (ref 0–0.2)
BASOPHILS NFR BLD AUTO: 0.8 %
BILIRUB SERPL-MCNC: 0.3 MG/DL (ref 0.1–2)
BILIRUB UR QL: NEGATIVE
BUN BLD-MCNC: 8 MG/DL (ref 7–18)
BUN/CREAT SERPL: 8.1 (ref 10–20)
CALCIUM BLD-MCNC: 9.7 MG/DL (ref 8.5–10.1)
CHLORIDE SERPL-SCNC: 108 MMOL/L (ref 98–112)
CHOLEST SMN-MCNC: 206 MG/DL (ref ?–200)
CO2 SERPL-SCNC: 28 MMOL/L (ref 21–32)
COLOR UR: YELLOW
CREAT BLD-MCNC: 0.99 MG/DL (ref 0.55–1.02)
DEPRECATED RDW RBC AUTO: 41.9 FL (ref 35.1–46.3)
EOSINOPHIL # BLD AUTO: 0.27 X10(3) UL (ref 0–0.7)
EOSINOPHIL NFR BLD AUTO: 3.1 %
ERYTHROCYTE [DISTWIDTH] IN BLOOD BY AUTOMATED COUNT: 13.4 % (ref 11–15)
GLOBULIN PLAS-MCNC: 6.2 G/DL (ref 2.8–4.4)
GLUCOSE BLD-MCNC: 87 MG/DL (ref 70–99)
GLUCOSE UR-MCNC: NEGATIVE MG/DL
HCT VFR BLD AUTO: 39 % (ref 35–48)
HDLC SERPL-MCNC: 39 MG/DL (ref 40–59)
HGB BLD-MCNC: 12.2 G/DL (ref 12–16)
IMM GRANULOCYTES # BLD AUTO: 0.03 X10(3) UL (ref 0–1)
IMM GRANULOCYTES NFR BLD: 0.3 %
KETONES UR-MCNC: NEGATIVE MG/DL
LDLC SERPL CALC-MCNC: 154 MG/DL (ref ?–100)
LYMPHOCYTES # BLD AUTO: 2.67 X10(3) UL (ref 1–4)
LYMPHOCYTES NFR BLD AUTO: 30.5 %
M PROTEIN MFR SERPL ELPH: 9.5 G/DL (ref 6.4–8.2)
MCH RBC QN AUTO: 26.6 PG (ref 26–34)
MCHC RBC AUTO-ENTMCNC: 31.3 G/DL (ref 31–37)
MCV RBC AUTO: 85.2 FL (ref 80–100)
MONOCYTES # BLD AUTO: 0.7 X10(3) UL (ref 0.1–1)
MONOCYTES NFR BLD AUTO: 8 %
NEUTROPHILS # BLD AUTO: 5.01 X10 (3) UL (ref 1.5–7.7)
NEUTROPHILS # BLD AUTO: 5.01 X10(3) UL (ref 1.5–7.7)
NEUTROPHILS NFR BLD AUTO: 57.3 %
NITRITE UR QL STRIP.AUTO: NEGATIVE
NONHDLC SERPL-MCNC: 167 MG/DL (ref ?–130)
OSMOLALITY SERPL CALC.SUM OF ELEC: 288 MOSM/KG (ref 275–295)
PATIENT FASTING Y/N/NP: YES
PATIENT FASTING Y/N/NP: YES
PH UR: 5 [PH] (ref 5–8)
PLATELET # BLD AUTO: 385 10(3)UL (ref 150–450)
POTASSIUM SERPL-SCNC: 4.1 MMOL/L (ref 3.5–5.1)
PROT UR-MCNC: NEGATIVE MG/DL
RBC # BLD AUTO: 4.58 X10(6)UL (ref 3.8–5.3)
RBC #/AREA URNS AUTO: 2 /HPF
SODIUM SERPL-SCNC: 140 MMOL/L (ref 136–145)
SP GR UR STRIP: 1.02 (ref 1–1.03)
TRIGL SERPL-MCNC: 65 MG/DL (ref 30–149)
TSI SER-ACNC: 2.89 MIU/ML (ref 0.36–3.74)
UROBILINOGEN UR STRIP-ACNC: <2
VIT B12 SERPL-MCNC: 427 PG/ML (ref 193–986)
VLDLC SERPL CALC-MCNC: 13 MG/DL (ref 0–30)
WBC # BLD AUTO: 8.8 X10(3) UL (ref 4–11)
WBC #/AREA URNS AUTO: 2 /HPF

## 2020-08-26 PROCEDURE — 85025 COMPLETE CBC W/AUTO DIFF WBC: CPT

## 2020-08-26 PROCEDURE — 81001 URINALYSIS AUTO W/SCOPE: CPT

## 2020-08-26 PROCEDURE — 84443 ASSAY THYROID STIM HORMONE: CPT

## 2020-08-26 PROCEDURE — 90471 IMMUNIZATION ADMIN: CPT | Performed by: NURSE PRACTITIONER

## 2020-08-26 PROCEDURE — 80053 COMPREHEN METABOLIC PANEL: CPT

## 2020-08-26 PROCEDURE — 82306 VITAMIN D 25 HYDROXY: CPT

## 2020-08-26 PROCEDURE — 3008F BODY MASS INDEX DOCD: CPT | Performed by: NURSE PRACTITIONER

## 2020-08-26 PROCEDURE — 80061 LIPID PANEL: CPT

## 2020-08-26 PROCEDURE — 36415 COLL VENOUS BLD VENIPUNCTURE: CPT

## 2020-08-26 PROCEDURE — 3075F SYST BP GE 130 - 139MM HG: CPT | Performed by: NURSE PRACTITIONER

## 2020-08-26 PROCEDURE — 82607 VITAMIN B-12: CPT

## 2020-08-26 PROCEDURE — 99396 PREV VISIT EST AGE 40-64: CPT | Performed by: NURSE PRACTITIONER

## 2020-08-26 PROCEDURE — 90715 TDAP VACCINE 7 YRS/> IM: CPT | Performed by: NURSE PRACTITIONER

## 2020-08-26 PROCEDURE — 3079F DIAST BP 80-89 MM HG: CPT | Performed by: NURSE PRACTITIONER

## 2020-08-26 NOTE — ASSESSMENT & PLAN NOTE
A/P patient with a history of vitamin D deficiency in the past.  She has been taking vitamin D3 2000 units daily over-the-counter. Plan  1) Draw lab for vitamin D level today.

## 2020-08-26 NOTE — ASSESSMENT & PLAN NOTE
A/P-amenorrhea since October 2019. Most likely menopause. She does follow with a gynecologist at our [facility] office. Plan  1) Patient to follow with yearly GYN exam.  2) Mammogram ordered.

## 2020-08-26 NOTE — ASSESSMENT & PLAN NOTE
A/P-44year-old -American female with a history of hypertension, vitamin D deficienPlancy, obesity, BMI 43, and history of hypertension and was taking losartan potassium in the past.  Due to the recall she was placed on Amlodipine.   Her blood pressu

## 2020-08-26 NOTE — ASSESSMENT & PLAN NOTE
A/P BMI 43. Patient has been getting less exercise working from home. Plan  1) Plant protein diet given to patient  2) Patient encouraged daily exercise.

## 2020-08-26 NOTE — PROGRESS NOTES
HPI:    Patient ID: Everett Martin is a 44year old female. Patient follows with [facility]. LMP October 2019. Has one child. 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     Immunization History  Administered breath. Cardiovascular: Negative for chest pain, palpitations and leg swelling. Gastrointestinal: Negative for nausea, vomiting, abdominal pain, diarrhea and constipation. Endocrine: Negative for cold intolerance and heat intolerance.    Melva Rao gallop and no friction rub. No murmur heard. Pulmonary/Chest: Effort normal and breath sounds normal. She has no wheezes. She has no rales. Abdominal: Soft. Bowel sounds are normal. She exhibits no mass. There is no tenderness. There is no guarding. Other    Obesity     A/P BMI 43. Patient has been getting less exercise working from home. Plan  1) Plant protein diet given to patient  2) Patient encouraged daily exercise.            Other Visit Diagnoses     Screening for colon cancer    -  Primary

## 2020-08-28 LAB — 25(OH)D3 SERPL-MCNC: 30.7 NG/ML (ref 30–100)

## 2020-08-29 ENCOUNTER — PATIENT MESSAGE (OUTPATIENT)
Dept: INTERNAL MEDICINE CLINIC | Facility: CLINIC | Age: 50
End: 2020-08-29

## 2020-08-29 NOTE — TELEPHONE ENCOUNTER
From: Christine Miller  To: Jil Schwab, APRN  Sent: 8/29/2020 8:51 AM CDT  Subject: Test Results Question    Hi, do I need a referral to see the GI specialist?  Do.I.need a referral for another urinalysis in September?

## 2020-09-08 DIAGNOSIS — I10 ESSENTIAL HYPERTENSION: ICD-10-CM

## 2020-09-08 RX ORDER — AMLODIPINE BESYLATE 10 MG/1
TABLET ORAL
Qty: 90 TABLET | Refills: 0 | Status: SHIPPED | OUTPATIENT
Start: 2020-09-08 | End: 2020-12-04

## 2020-09-22 ENCOUNTER — HOSPITAL ENCOUNTER (OUTPATIENT)
Dept: MAMMOGRAPHY | Age: 50
Discharge: HOME | End: 2020-09-22
Attending: NURSE PRACTITIONER
Payer: COMMERCIAL

## 2020-09-22 DIAGNOSIS — Z12.31 ENCOUNTER FOR SCREENING MAMMOGRAM FOR MALIGNANT NEOPLASM OF BREAST: ICD-10-CM

## 2020-09-22 PROCEDURE — 77067 SCR MAMMO BI INCL CAD: CPT | Performed by: NURSE PRACTITIONER

## 2020-09-22 PROCEDURE — 77063 BREAST TOMOSYNTHESIS BI: CPT | Performed by: NURSE PRACTITIONER

## 2020-10-01 ENCOUNTER — LAB ENCOUNTER (OUTPATIENT)
Dept: LAB | Age: 50
End: 2020-10-01
Attending: NURSE PRACTITIONER
Payer: COMMERCIAL

## 2020-10-01 DIAGNOSIS — R31.9 HEMATURIA, UNSPECIFIED TYPE: ICD-10-CM

## 2020-10-01 PROCEDURE — 81001 URINALYSIS AUTO W/SCOPE: CPT

## 2020-10-07 ENCOUNTER — OFFICE VISIT (OUTPATIENT)
Dept: SURGERY | Facility: CLINIC | Age: 50
End: 2020-10-07

## 2020-10-07 VITALS
BODY MASS INDEX: 43.54 KG/M2 | TEMPERATURE: 99 F | HEIGHT: 64 IN | WEIGHT: 255 LBS | SYSTOLIC BLOOD PRESSURE: 128 MMHG | DIASTOLIC BLOOD PRESSURE: 72 MMHG

## 2020-10-07 DIAGNOSIS — R31.29 MICROHEMATURIA: Primary | ICD-10-CM

## 2020-10-07 PROCEDURE — 3008F BODY MASS INDEX DOCD: CPT | Performed by: NURSE PRACTITIONER

## 2020-10-07 PROCEDURE — 3078F DIAST BP <80 MM HG: CPT | Performed by: NURSE PRACTITIONER

## 2020-10-07 PROCEDURE — 3074F SYST BP LT 130 MM HG: CPT | Performed by: NURSE PRACTITIONER

## 2020-10-07 PROCEDURE — 99243 OFF/OP CNSLTJ NEW/EST LOW 30: CPT | Performed by: NURSE PRACTITIONER

## 2020-10-07 RX ORDER — PYRIDOXINE HCL (VITAMIN B6) 100 MG
TABLET ORAL
COMMUNITY

## 2020-10-07 RX ORDER — ACETAMINOPHEN 160 MG
2000 TABLET,DISINTEGRATING ORAL DAILY
COMMUNITY

## 2020-10-07 NOTE — PROGRESS NOTES
HPI:    Patient ID: Mike Bueno is a 44year old female. HPI     Patient is a 44year old female who presents to the clinic for a consult regarding microhematuria. Past medical history of HTN, obesity, and vitamin D deficiency.     Patient Suspension 2 sprays by Nasal route daily.  (Patient not taking: Reported on 8/26/2020 ) 1 Inhaler 0     Allergies:No Known Allergies    HISTORY:  Past Medical History:   Diagnosis Date   • Essential hypertension    • Fibroids 2006    surgery      Past Surgi

## 2020-10-14 ENCOUNTER — OFFICE VISIT (OUTPATIENT)
Dept: GASTROENTEROLOGY | Facility: CLINIC | Age: 50
End: 2020-10-14

## 2020-10-14 ENCOUNTER — TELEPHONE (OUTPATIENT)
Dept: GASTROENTEROLOGY | Facility: CLINIC | Age: 50
End: 2020-10-14

## 2020-10-14 VITALS
DIASTOLIC BLOOD PRESSURE: 84 MMHG | SYSTOLIC BLOOD PRESSURE: 138 MMHG | BODY MASS INDEX: 44.56 KG/M2 | WEIGHT: 261 LBS | HEIGHT: 64 IN | TEMPERATURE: 98 F

## 2020-10-14 DIAGNOSIS — R74.8 ELEVATED ALKALINE PHOSPHATASE LEVEL: ICD-10-CM

## 2020-10-14 DIAGNOSIS — Z12.11 SCREEN FOR COLON CANCER: Primary | ICD-10-CM

## 2020-10-14 DIAGNOSIS — Z12.11 ENCOUNTER FOR SCREENING COLONOSCOPY: Primary | ICD-10-CM

## 2020-10-14 PROCEDURE — 3008F BODY MASS INDEX DOCD: CPT | Performed by: INTERNAL MEDICINE

## 2020-10-14 PROCEDURE — 3075F SYST BP GE 130 - 139MM HG: CPT | Performed by: INTERNAL MEDICINE

## 2020-10-14 PROCEDURE — S0285 CNSLT BEFORE SCREEN COLONOSC: HCPCS | Performed by: INTERNAL MEDICINE

## 2020-10-14 PROCEDURE — 3079F DIAST BP 80-89 MM HG: CPT | Performed by: INTERNAL MEDICINE

## 2020-10-14 RX ORDER — SODIUM, POTASSIUM,MAG SULFATES 17.5-3.13G
SOLUTION, RECONSTITUTED, ORAL ORAL
Qty: 1 BOTTLE | Refills: 0 | Status: SHIPPED | OUTPATIENT
Start: 2020-10-14 | End: 2021-03-25 | Stop reason: ALTCHOICE

## 2020-10-14 NOTE — TELEPHONE ENCOUNTER
Scheduled for:  Colonoscopy 14241  Provider Name:  Dr. Wally Romero  Date:  12/18/2020  Location:  [facility]  Sedation:  MAC  Time:  9:00 am, arrival 8:00 am  Prep:  Suprep  Meds/Allergies Reconciled?:  Physician reviewed  Diagnosis with codes:  Screening for colon can

## 2020-10-14 NOTE — H&P
[address] [facility] [address] Gastroenterology                                                                                                  Clinic History and Physical     Pa Never Smoker      Smokeless tobacco: Never Used    Alcohol use: Yes      Comment: very rare     Drug use: No       Medications (Active prior to today's visit):  Current Outpatient Medications   Medication Sig Dispense Refill   • Vitamin D3 50 MCG (2000 UT) breathing  Abdomen- soft, non-tender exam in all quadrants without rigidity or guarding, non-distended, no abnormal bowel sounds noted, no masses are palpated  Skin- No jaundice  Ext: no cyanosis, clubbing or edema is evident.    Neuro- Alert and interactiv procedure(s)   - NO herbal supplements or weight loss medications x 7 days prior to the procedure(s)    ** If MAC @ [facility] or IV twilight - continue all medications as prescribed        Colonoscopy consent: I have discussed the risks (including risk of delaye

## 2020-10-14 NOTE — PATIENT INSTRUCTIONS
1. Schedule colonoscopy with MAC sedation [Diagnosis: screening]    2.  bowel prep from pharmacy (split suprep or trilyte)    3.  Continue all medications for procedure except    ** If MAC @ [facility]:    - NO alcohol, recreational drugs nor erectile dy

## 2020-10-15 ENCOUNTER — LAB ENCOUNTER (OUTPATIENT)
Dept: LAB | Age: 50
End: 2020-10-15
Attending: INTERNAL MEDICINE
Payer: COMMERCIAL

## 2020-10-15 DIAGNOSIS — R74.8 ELEVATED ALKALINE PHOSPHATASE LEVEL: ICD-10-CM

## 2020-10-15 PROCEDURE — 84080 ASSAY ALKALINE PHOSPHATASES: CPT

## 2020-10-15 PROCEDURE — 84075 ASSAY ALKALINE PHOSPHATASE: CPT

## 2020-10-15 PROCEDURE — 36415 COLL VENOUS BLD VENIPUNCTURE: CPT

## 2020-10-20 ENCOUNTER — TELEPHONE (OUTPATIENT)
Dept: GASTROENTEROLOGY | Facility: CLINIC | Age: 50
End: 2020-10-20

## 2020-10-20 NOTE — TELEPHONE ENCOUNTER
----- Message from Nelia Castleman, MD sent at 10/19/2020 10:10 AM CDT -----  Follow up in 2-3 months with me in clinic to do chronic liver disease workup  SOPHIA Barriga

## 2020-11-25 ENCOUNTER — OFFICE VISIT (OUTPATIENT)
Dept: GASTROENTEROLOGY | Facility: CLINIC | Age: 50
End: 2020-11-25

## 2020-11-25 VITALS
HEIGHT: 64 IN | BODY MASS INDEX: 44.05 KG/M2 | WEIGHT: 258 LBS | SYSTOLIC BLOOD PRESSURE: 140 MMHG | DIASTOLIC BLOOD PRESSURE: 80 MMHG

## 2020-11-25 DIAGNOSIS — R79.89 ELEVATED LFTS: Primary | ICD-10-CM

## 2020-11-25 PROCEDURE — 3008F BODY MASS INDEX DOCD: CPT | Performed by: INTERNAL MEDICINE

## 2020-11-25 PROCEDURE — 3079F DIAST BP 80-89 MM HG: CPT | Performed by: INTERNAL MEDICINE

## 2020-11-25 PROCEDURE — 99213 OFFICE O/P EST LOW 20 MIN: CPT | Performed by: INTERNAL MEDICINE

## 2020-11-25 PROCEDURE — 3077F SYST BP >= 140 MM HG: CPT | Performed by: INTERNAL MEDICINE

## 2020-11-25 NOTE — PROGRESS NOTES
[address] Gastroenterology  Clinic Follow-up Visit    Patient presents with:   Follow - Up        Subjective/HPI:   Christine MCHUGH Elliot Burt is a 44year old year-old female, patient of Dr. Shravan Nowak with history of elevated alk phos, HTN and fibroid (115.7 kg)  08/15/19 : 256 lb 2 oz (116.2 kg)  08/03/18 : 249 lb 14.4 oz (113.4 kg)  08/02/18 : 250 lb (113.4 kg)  07/20/17 : 254 lb (115.2 kg)  07/20/17 : 254 lb 14.4 oz (115.6 kg)      Pertinent Surgical Hx:  - See additional surgical hx below  - No know taking: Reported on 8/26/2020 ) 1 Inhaler 0   • Fluticasone Propionate 50 MCG/ACT Nasal Suspension 2 sprays by Nasal route daily.  (Patient not taking: Reported on 8/26/2020 ) 1 Inhaler 0       Allergies:  No Known Allergies    ROS:   CONSTITUTIONAL:  negat alk phos      1. Elevated Alk phos since 2015, liver predominant    Recommend:  Check RUQ US  Check chronic liver disease workup  Will discuss as colonoscopy if back        Orders This Visit:  No orders of the defined types were placed in this encounter.

## 2020-11-27 ENCOUNTER — LAB ENCOUNTER (OUTPATIENT)
Dept: LAB | Age: 50
End: 2020-11-27
Attending: INTERNAL MEDICINE
Payer: COMMERCIAL

## 2020-11-27 DIAGNOSIS — R79.89 ELEVATED LFTS: ICD-10-CM

## 2020-11-27 PROCEDURE — 82784 ASSAY IGA/IGD/IGG/IGM EACH: CPT

## 2020-11-27 PROCEDURE — 82105 ALPHA-FETOPROTEIN SERUM: CPT

## 2020-11-27 PROCEDURE — 83540 ASSAY OF IRON: CPT

## 2020-11-27 PROCEDURE — 82977 ASSAY OF GGT: CPT

## 2020-11-27 PROCEDURE — 82103 ALPHA-1-ANTITRYPSIN TOTAL: CPT

## 2020-11-27 PROCEDURE — 84466 ASSAY OF TRANSFERRIN: CPT

## 2020-11-27 PROCEDURE — 82390 ASSAY OF CERULOPLASMIN: CPT

## 2020-11-27 PROCEDURE — 86256 FLUORESCENT ANTIBODY TITER: CPT

## 2020-11-27 PROCEDURE — 86255 FLUORESCENT ANTIBODY SCREEN: CPT

## 2020-11-27 PROCEDURE — 86038 ANTINUCLEAR ANTIBODIES: CPT

## 2020-11-27 PROCEDURE — 85610 PROTHROMBIN TIME: CPT

## 2020-11-27 PROCEDURE — 82728 ASSAY OF FERRITIN: CPT

## 2020-11-27 PROCEDURE — 80074 ACUTE HEPATITIS PANEL: CPT | Performed by: INTERNAL MEDICINE

## 2020-11-27 PROCEDURE — 36415 COLL VENOUS BLD VENIPUNCTURE: CPT

## 2020-12-04 DIAGNOSIS — I10 ESSENTIAL HYPERTENSION: ICD-10-CM

## 2020-12-04 NOTE — TELEPHONE ENCOUNTER
Provider should be [address] [place] [address] request for refill    •  AMLODIPINE BESYLATE 10 MG Oral Tab, TAKE ONE TABLET BY MOUTH DAILY, Disp: 90 tablet, Rfl: 0

## 2020-12-05 RX ORDER — AMLODIPINE BESYLATE 10 MG/1
10 TABLET ORAL DAILY
Qty: 90 TABLET | Refills: 0 | Status: SHIPPED | OUTPATIENT
Start: 2020-12-05 | End: 2021-03-04

## 2020-12-11 DIAGNOSIS — K76.0 NAFLD (NONALCOHOLIC FATTY LIVER DISEASE): Primary | ICD-10-CM

## 2020-12-11 DIAGNOSIS — R79.1 ELEVATED INR: ICD-10-CM

## 2020-12-14 ENCOUNTER — TELEPHONE (OUTPATIENT)
Dept: GASTROENTEROLOGY | Facility: CLINIC | Age: 50
End: 2020-12-14

## 2020-12-14 RX ORDER — SODIUM CHLORIDE, SODIUM LACTATE, POTASSIUM CHLORIDE, CALCIUM CHLORIDE 600; 310; 30; 20 MG/100ML; MG/100ML; MG/100ML; MG/100ML
INJECTION, SOLUTION INTRAVENOUS CONTINUOUS
Status: CANCELLED | OUTPATIENT
Start: 2020-12-14

## 2020-12-14 NOTE — TELEPHONE ENCOUNTER
I spoke to the pt and we cancelled her US abdomen.     She will call CS and scheduled the US w/elastography

## 2020-12-14 NOTE — TELEPHONE ENCOUNTER
Dr Dorcas Ann,    Pt is already scheduled for US abdomen tomorrow. Do you still want her to get this test done or do you want her to have the [address] liver with elastography?

## 2020-12-15 ENCOUNTER — APPOINTMENT (OUTPATIENT)
Dept: ULTRASOUND IMAGING | Age: 50
End: 2020-12-15
Attending: INTERNAL MEDICINE
Payer: COMMERCIAL

## 2020-12-15 ENCOUNTER — LAB ENCOUNTER (OUTPATIENT)
Dept: LAB | Age: 50
End: 2020-12-15
Attending: INTERNAL MEDICINE
Payer: COMMERCIAL

## 2020-12-15 ENCOUNTER — TELEPHONE (OUTPATIENT)
Dept: GASTROENTEROLOGY | Facility: CLINIC | Age: 50
End: 2020-12-15

## 2020-12-15 DIAGNOSIS — Z12.11 COLON CANCER SCREENING: Primary | ICD-10-CM

## 2020-12-15 DIAGNOSIS — Z01.818 PRE-OP TESTING: ICD-10-CM

## 2020-12-15 NOTE — TELEPHONE ENCOUNTER
Rescheduled for:  Colonoscopy - 64044  Provider Name:  Dr. Kimmy Kiran  Date:  FROM - 12/18/20              TO - 2/19/21  Location:  [facility]  Sedation:  MAC  Time:  FROM - 9:00 am                TO - 9:15 am (pt is aware to arrive at 8:15 am)  Prep:  Suprep  Meds/Al

## 2020-12-27 ENCOUNTER — PATIENT MESSAGE (OUTPATIENT)
Dept: INTERNAL MEDICINE CLINIC | Facility: CLINIC | Age: 50
End: 2020-12-27

## 2020-12-28 ENCOUNTER — TELEPHONE (OUTPATIENT)
Dept: INTERNAL MEDICINE CLINIC | Facility: CLINIC | Age: 50
End: 2020-12-28

## 2020-12-28 ENCOUNTER — TELEMEDICINE (OUTPATIENT)
Dept: INTERNAL MEDICINE CLINIC | Facility: CLINIC | Age: 50
End: 2020-12-28

## 2020-12-28 DIAGNOSIS — L29.9 PRURITUS: Primary | ICD-10-CM

## 2020-12-28 PROCEDURE — 99213 OFFICE O/P EST LOW 20 MIN: CPT | Performed by: INTERNAL MEDICINE

## 2020-12-28 RX ORDER — PREDNISONE 20 MG/1
40 TABLET ORAL DAILY
Qty: 10 TABLET | Refills: 0 | Status: SHIPPED | OUTPATIENT
Start: 2020-12-28 | End: 2021-01-02

## 2020-12-28 NOTE — PATIENT INSTRUCTIONS
1.  Take steroids as prescribed. 2.  Okay to take Benadryl up to 3 times daily. 3.  Make appointment for follow-up in 1 week. 4.  Keep diary and see what may be causing your symptoms.

## 2020-12-28 NOTE — PROGRESS NOTES
Patient ID: Jamshid Wright is a 44year old female. Patient presents with:  Itching: All over body. Virtual/Telephone Check-In    Jamshid Wright verbally consents to a Virtual/Telephone Check-In service on 12/28/20.  Patient und (Patient not taking: Reported on 11/25/2020 ), Disp: 1 Bottle, Rfl: 0  •  Vitamin D3 50 MCG (2000 UT) Oral Cap, Take 2,000 Units by mouth daily. , Disp: , Rfl:   •  Calcium 500-125 MG-UNIT Oral Tab, Take by mouth., Disp: , Rfl:   •  benzonatate 200 MG Oral Not on file        Emotionally abused: Not on file        Physically abused: Not on file        Forced sexual activity: Not on file    Other Topics      Concerns:         Service: Not Asked        Blood Transfusions: Not Asked        Caffeine Marivel restrictions of visitation. There are limitations of this visit as no physical exam could be performed. Every conscious effort was taken to allow for sufficient and adequate time.   This billing was spent on reviewing labs, medications, radiology tests an

## 2020-12-28 NOTE — TELEPHONE ENCOUNTER
Spoke with pt in regards to my chart message below. Pt states has itching from shoulders to knees for several weeks. Pt states no rash present, but she does develop irritation when she scratches. Pt has tried several OTC remedies without relief.

## 2020-12-28 NOTE — TELEPHONE ENCOUNTER
From: Christine Giles  To: XIAO Gray  Sent: 12/27/2020 6:29 PM CST  Subject: Prescription Question    Dr. Jeremias Francisco,    I am having severe itching, mainly from my shoulders to my knees. It started several weeks ago, but is now agonizing.  I henna

## 2020-12-28 NOTE — TELEPHONE ENCOUNTER
----- Message from [address] [place] [address] HALLEY Arteaga sent at 12/27/2020  6:29 PM CST -----  Regarding: Prescription Question  Contact: [phone]  Dr. Sophie Kim,    I am having severe itching, mainly from my shoulders to my knees.  It started several weeks ago, but

## 2021-01-06 ENCOUNTER — HOSPITAL ENCOUNTER (OUTPATIENT)
Dept: ULTRASOUND IMAGING | Facility: HOSPITAL | Age: 51
Discharge: HOME | End: 2021-01-06
Attending: INTERNAL MEDICINE
Payer: COMMERCIAL

## 2021-01-06 DIAGNOSIS — R79.89 ELEVATED LFTS: ICD-10-CM

## 2021-01-06 PROCEDURE — 76705 ECHO EXAM OF ABDOMEN: CPT | Performed by: INTERNAL MEDICINE

## 2021-01-12 ENCOUNTER — TELEPHONE (OUTPATIENT)
Dept: GASTROENTEROLOGY | Facility: CLINIC | Age: 51
End: 2021-01-12

## 2021-01-12 NOTE — TELEPHONE ENCOUNTER
1st reminder letter mailed out to patient regarding her overdue imaging order;       [address] [place] [address] (CPT=[id number])

## 2021-02-15 RX ORDER — ACETAMINOPHEN 500 MG
TABLET ORAL
COMMUNITY

## 2021-02-16 ENCOUNTER — TELEPHONE (OUTPATIENT)
Dept: GASTROENTEROLOGY | Facility: CLINIC | Age: 51
End: 2021-02-16

## 2021-02-16 DIAGNOSIS — Z12.11 COLON CANCER SCREENING: Primary | ICD-10-CM

## 2021-02-16 NOTE — TELEPHONE ENCOUNTER
Rescheduled for:  Colonoscopy - 94658  Provider Name:  Dr. Jorge Cheng  Date:  FROM - 2/19/21                 TO - 5/21/21  Location:  [facility]  Sedation:  MAC  Time:  FROM - 9:15 am               TO - 9:15 am (pt is aware to arrive at 8:15 am)  Prep:  Suprep  Meds/A

## 2021-03-04 DIAGNOSIS — Z23 NEED FOR VACCINATION: ICD-10-CM

## 2021-03-04 DIAGNOSIS — I10 ESSENTIAL HYPERTENSION: ICD-10-CM

## 2021-03-04 RX ORDER — AMLODIPINE BESYLATE 10 MG/1
10 TABLET ORAL DAILY
Qty: 90 TABLET | Refills: 0 | Status: SHIPPED | OUTPATIENT
Start: 2021-03-04 | End: 2021-06-08

## 2021-03-04 NOTE — TELEPHONE ENCOUNTER
Current Outpatient Medications   Medication Sig Dispense Refill   • amLODIPine Besylate 10 MG Oral Tab Take 1 tablet (10 mg total) by mouth daily.  90 tablet 0

## 2021-03-04 NOTE — TELEPHONE ENCOUNTER
Please schedule a phone visit, video visit or in person visit.     OFELIA Leo  Wokring with Dr. Junior Huang

## 2021-03-25 ENCOUNTER — OFFICE VISIT (OUTPATIENT)
Dept: INTERNAL MEDICINE CLINIC | Facility: CLINIC | Age: 51
End: 2021-03-25

## 2021-03-25 VITALS
HEIGHT: 64 IN | SYSTOLIC BLOOD PRESSURE: 141 MMHG | HEART RATE: 105 BPM | DIASTOLIC BLOOD PRESSURE: 85 MMHG | BODY MASS INDEX: 45.02 KG/M2 | WEIGHT: 263.69 LBS

## 2021-03-25 DIAGNOSIS — E55.9 VITAMIN D DEFICIENCY: ICD-10-CM

## 2021-03-25 DIAGNOSIS — I10 ESSENTIAL HYPERTENSION: Primary | ICD-10-CM

## 2021-03-25 PROCEDURE — 3079F DIAST BP 80-89 MM HG: CPT | Performed by: NURSE PRACTITIONER

## 2021-03-25 PROCEDURE — 99214 OFFICE O/P EST MOD 30 MIN: CPT | Performed by: NURSE PRACTITIONER

## 2021-03-25 PROCEDURE — 3008F BODY MASS INDEX DOCD: CPT | Performed by: NURSE PRACTITIONER

## 2021-03-25 PROCEDURE — 3077F SYST BP >= 140 MM HG: CPT | Performed by: NURSE PRACTITIONER

## 2021-03-25 NOTE — ASSESSMENT & PLAN NOTE
Patient with Hx of hypertension. She is on amlodipine 10mg daily. She missed a few doses this week but will get back on schedule.     Plan  1) Discussed Wt loss  2) Plant protein diet  3) Low salt diet  4) Continue amlodipine 10mg po Daily  5) Discussed lif

## 2021-03-25 NOTE — PROGRESS NOTES
HPI:    Patient ID: Lavern Perez is a 44year old female. LMP in October  Patient vision unlimited. Administration. HPI Follow up. on Medications. Hypertension 130/80. Patient admits to missing some of her B/P pills.   Essential hypertension (Non-Medical):   Physical Activity:       Days of Exercise per Week:       Minutes of Exercise per Session:   Stress:       Feeling of Stress :   Social Connections:       Frequency of Communication with Friends and Family:       Frequency of Social Gather HFA (PROAIR HFA) 108 (90 Base) MCG/ACT Inhalation Aero Soln 2 puffs every 4-6 hours as needed 1 Inhaler 0   • Fluticasone Propionate 50 MCG/ACT Nasal Suspension 2 sprays by Nasal route daily.  1 Inhaler 0     Allergies:No Known Allergies   PHYSICAL EXAM: mass index is 45.26 kg/m². (2)           ASSESSMENT/PLAN:     Problem List Items Addressed This Visit        Unprioritized    Essential hypertension - Primary     Patient with Hx of hypertension. She is on amlodipine 10mg daily.  She missed a few doses this

## 2021-04-26 ENCOUNTER — TELEPHONE (OUTPATIENT)
Dept: GASTROENTEROLOGY | Facility: CLINIC | Age: 51
End: 2021-04-26

## 2021-04-26 DIAGNOSIS — Z12.11 COLON CANCER SCREENING: Primary | ICD-10-CM

## 2021-04-26 NOTE — TELEPHONE ENCOUNTER
LMTCB to reschedule procedure since Dr. Dorcas Ann will be out of town.  Please transfer to [facility] in GI

## 2021-04-28 NOTE — TELEPHONE ENCOUNTER
Rescheduled for:  Colonoscopy [address]  Provider Name:  Dr. Cecille Rojas  Date:    From-5/21/21  To-6/11/21  Location:    [facility]  Sedation:  MAC  Time:    From-0915  To-1445 (pt is aware to arrive at 1345)   Prep:  Suprep, sent new instructions via [employer]  Meds/Allerg

## 2021-05-19 ENCOUNTER — TELEPHONE (OUTPATIENT)
Dept: GASTROENTEROLOGY | Facility: CLINIC | Age: 51
End: 2021-05-19

## 2021-05-19 DIAGNOSIS — Z12.11 COLON CANCER SCREENING: Primary | ICD-10-CM

## 2021-05-19 NOTE — TELEPHONE ENCOUNTER
Dr. Alanna Packer--    This patient was scheduled in error on your [facility] day when she should have been schedule at [address] [place] [address] because of her high BMI. Please advise if I can reschedule her on 8/6/21 at 1100 which would be going into your break.  I can not find anything olivia

## 2021-05-20 NOTE — TELEPHONE ENCOUNTER
Dr. Eliza Salazar--    Alexandro 150 BMI is nothing over 40 which she is well over that so I would have to schedule her at [address] [place] [address]. I will reschedule her on 5/27.  Thank you

## 2021-05-20 NOTE — TELEPHONE ENCOUNTER
There are days at [facility] 150. Can she be done there.  You can add her thurs next week 5/27 as I am on call and at the hospital otherwise there is time 6/4

## 2021-05-20 NOTE — TELEPHONE ENCOUNTER
Pt. Is returning surgery sched call. I tried to reach, but not avail., Pt. Wants the nurse to know that she does accept the appt for Thursday 5/27/2021.

## 2021-05-21 NOTE — TELEPHONE ENCOUNTER
Rescheduled for:  Colonoscopy [address]00  Provider Name:  Dr. Nassar Court  Date:                From-6/11/21               To-5/27/21  Location:    [facility]  Sedation:  MAC  Time:                [id number]                   [id number] (pt is aware to arrive at 0815)   Prep:

## 2021-05-24 ENCOUNTER — LAB ENCOUNTER (OUTPATIENT)
Dept: LAB | Age: 51
End: 2021-05-24
Attending: INTERNAL MEDICINE
Payer: COMMERCIAL

## 2021-05-24 DIAGNOSIS — Z01.818 PRE-OP TESTING: ICD-10-CM

## 2021-05-27 ENCOUNTER — ANESTHESIA EVENT (OUTPATIENT)
Dept: ENDOSCOPY | Facility: HOSPITAL | Age: 51
End: 2021-05-27
Payer: COMMERCIAL

## 2021-05-27 ENCOUNTER — ANESTHESIA (OUTPATIENT)
Dept: ENDOSCOPY | Facility: HOSPITAL | Age: 51
End: 2021-05-27
Payer: COMMERCIAL

## 2021-05-27 ENCOUNTER — HOSPITAL ENCOUNTER (OUTPATIENT)
Facility: HOSPITAL | Age: 51
Setting detail: HOSPITAL OUTPATIENT SURGERY
Discharge: HOME | End: 2021-05-27
Attending: INTERNAL MEDICINE | Admitting: INTERNAL MEDICINE
Payer: COMMERCIAL

## 2021-05-27 VITALS
WEIGHT: 285 LBS | DIASTOLIC BLOOD PRESSURE: 64 MMHG | TEMPERATURE: 98 F | HEART RATE: 93 BPM | RESPIRATION RATE: 20 BRPM | OXYGEN SATURATION: 97 % | SYSTOLIC BLOOD PRESSURE: 101 MMHG | HEIGHT: 64.5 IN | BODY MASS INDEX: 48.06 KG/M2

## 2021-05-27 DIAGNOSIS — Z12.11 SCREEN FOR COLON CANCER: ICD-10-CM

## 2021-05-27 DIAGNOSIS — Z01.818 PRE-OP TESTING: Primary | ICD-10-CM

## 2021-05-27 PROCEDURE — 0DBN8ZX EXCISION OF SIGMOID COLON, VIA NATURAL OR ARTIFICIAL OPENING ENDOSCOPIC, DIAGNOSTIC: ICD-10-PCS | Performed by: INTERNAL MEDICINE

## 2021-05-27 PROCEDURE — 45380 COLONOSCOPY AND BIOPSY: CPT | Performed by: INTERNAL MEDICINE

## 2021-05-27 RX ORDER — SODIUM CHLORIDE, SODIUM LACTATE, POTASSIUM CHLORIDE, CALCIUM CHLORIDE 600; 310; 30; 20 MG/100ML; MG/100ML; MG/100ML; MG/100ML
INJECTION, SOLUTION INTRAVENOUS CONTINUOUS
Status: DISCONTINUED | OUTPATIENT
Start: 2021-05-27 | End: 2021-05-27

## 2021-05-27 RX ORDER — NALOXONE HYDROCHLORIDE 0.4 MG/ML
80 INJECTION, SOLUTION INTRAMUSCULAR; INTRAVENOUS; SUBCUTANEOUS AS NEEDED
Status: DISCONTINUED | OUTPATIENT
Start: 2021-05-27 | End: 2021-05-27

## 2021-05-27 RX ORDER — LIDOCAINE HYDROCHLORIDE 10 MG/ML
INJECTION, SOLUTION EPIDURAL; INFILTRATION; INTRACAUDAL; PERINEURAL AS NEEDED
Status: DISCONTINUED | OUTPATIENT
Start: 2021-05-27 | End: 2021-05-27 | Stop reason: SURG

## 2021-05-27 RX ADMIN — SODIUM CHLORIDE, SODIUM LACTATE, POTASSIUM CHLORIDE, CALCIUM CHLORIDE: 600; 310; 30; 20 INJECTION, SOLUTION INTRAVENOUS at 10:14:00

## 2021-05-27 RX ADMIN — LIDOCAINE HYDROCHLORIDE 50 MG: 10 INJECTION, SOLUTION EPIDURAL; INFILTRATION; INTRACAUDAL; PERINEURAL at 10:14:00

## 2021-05-27 RX ADMIN — SODIUM CHLORIDE, SODIUM LACTATE, POTASSIUM CHLORIDE, CALCIUM CHLORIDE: 600; 310; 30; 20 INJECTION, SOLUTION INTRAVENOUS at 10:32:00

## 2021-05-27 NOTE — ANESTHESIA PREPROCEDURE EVALUATION
Anesthesia PreOp Note    HPI:     Everett Martin is a 44year old female who presents for preoperative consultation requested by: Avelino Vu MD    Date of Surgery: 5/27/2021    Procedure(s):  COLONOSCOPY  Indication: Screen for colon cancer Not on file      Highest education level: Not on file    Occupational History      Not on file    Tobacco Use      Smoking status: Never Smoker      Smokeless tobacco: Never Used    Vaping Use      Vaping Use: Never used    Substance and Sexual Activity for this visit. There were no vitals filed for this visit.      Anesthesia Evaluation     Patient summary reviewed and Nursing notes reviewed    Airway   Mallampati: III  Dental      Pulmonary - negative ROS    breath sounds clear to auscultation  Cardiov

## 2021-05-27 NOTE — H&P
Pre Procedure History & Physical Examination    Patient Name: Maria Del Rosario Pastor  MRN: [id number]  [facility]: [id number]  YOB: 1970    Diagnosis: screening    amLODIPine Besylate 10 MG Oral Tab, Take 1 tablet (10 mg total) by mouth daily. , hematuria  INTEGUMENT/BREAST:  SKIN:  negative for jaundice   ALLERGIC/IMMUNOLOGIC:  negative for hay fever  ENDOCRINE:  negative for cold intolerance and heat intolerance  MUSCULOSKELETAL:  negative for joint effusion/severe erythema  BEHAVIOR/PSYCH:  neg

## 2021-05-27 NOTE — OPERATIVE REPORT
COLONOSCOPY REPORT    Christine Borges Letters     1970 Age 44year old   PCP Belinda Almonte MD Endoscopist Ashli Lieberman MD     Date of procedure: 21    Procedure: Colonoscopy w/ cold biopsy polypectomy    Pre-operative diagnosis: screenin hemorrhoids. 5. The colonic mucosa throughout the colon showed normal vascular pattern, without evidence of angioectasias or inflammation. 6. MILIND: normal rectal tone, no masses palpated. Recommend:  · Await pathology.  The interval for the next

## 2021-05-27 NOTE — ANESTHESIA POSTPROCEDURE EVALUATION
Patient: Amish Velazquez    Procedure Summary     Date: 05/27/21 Room / Location: [address] [place] [address] ENDOSCOPY 05 / [address] [place] [address] ENDOSCOPY    Anesthesia Start: 6867 Anesthesia Stop: 2689    Procedure: COLONOSCOPY (N/A ) Diagnosis:       Screen for colon cancer      (col

## 2021-06-04 ENCOUNTER — TELEPHONE (OUTPATIENT)
Dept: INTERNAL MEDICINE CLINIC | Facility: CLINIC | Age: 51
End: 2021-06-04

## 2021-06-08 DIAGNOSIS — I10 ESSENTIAL HYPERTENSION: ICD-10-CM

## 2021-06-08 RX ORDER — AMLODIPINE BESYLATE 10 MG/1
10 TABLET ORAL DAILY
Qty: 90 TABLET | Refills: 0 | Status: SHIPPED | OUTPATIENT
Start: 2021-06-08 | End: 2021-09-03

## 2021-08-31 ENCOUNTER — OFFICE VISIT (OUTPATIENT)
Dept: INTERNAL MEDICINE CLINIC | Facility: CLINIC | Age: 51
End: 2021-08-31

## 2021-08-31 VITALS
HEIGHT: 64 IN | SYSTOLIC BLOOD PRESSURE: 132 MMHG | TEMPERATURE: 98 F | BODY MASS INDEX: 50.02 KG/M2 | HEART RATE: 92 BPM | WEIGHT: 293 LBS | DIASTOLIC BLOOD PRESSURE: 81 MMHG

## 2021-08-31 DIAGNOSIS — E55.9 VITAMIN D DEFICIENCY: ICD-10-CM

## 2021-08-31 DIAGNOSIS — I10 ESSENTIAL HYPERTENSION: ICD-10-CM

## 2021-08-31 DIAGNOSIS — Z12.31 ENCOUNTER FOR SCREENING MAMMOGRAM FOR MALIGNANT NEOPLASM OF BREAST: Primary | ICD-10-CM

## 2021-08-31 DIAGNOSIS — Z00.00 ANNUAL PHYSICAL EXAM: ICD-10-CM

## 2021-08-31 PROCEDURE — 3079F DIAST BP 80-89 MM HG: CPT | Performed by: NURSE PRACTITIONER

## 2021-08-31 PROCEDURE — 3008F BODY MASS INDEX DOCD: CPT | Performed by: NURSE PRACTITIONER

## 2021-08-31 PROCEDURE — 99396 PREV VISIT EST AGE 40-64: CPT | Performed by: NURSE PRACTITIONER

## 2021-08-31 PROCEDURE — 3075F SYST BP GE 130 - 139MM HG: CPT | Performed by: NURSE PRACTITIONER

## 2021-08-31 NOTE — ASSESSMENT & PLAN NOTE
Patient with Hx of hypertension. She is on amlodipine 10mg daily. She has been taking her B/P medication.     Plan  1) Discussed Wt loss  2) Plant protein diet  3) Low salt diet  4) Continue amlodipine 10mg po Daily  5) Discussed lifestyle modifications inc

## 2021-08-31 NOTE — ASSESSMENT & PLAN NOTE
Normal exam.  Labs as ordered. Skin check normal.  No significant abnormal nevi. Breast exam completed–no palpable abnormalities, discharge from the nipples or axillary adenopathy. No cervical or inguinal lymphadenopathy. Hernial orifices intact.   Pap

## 2021-08-31 NOTE — ASSESSMENT & PLAN NOTE
A/P patient with a history of vitamin D deficiency she takes vitamin D 2000 units/day.     Lab  1) Vitamin D level

## 2021-08-31 NOTE — PROGRESS NOTES
HPI:    Patient ID: Levy Bustos is a 44year old female. LMP October. Patient last sexual relation in May. Patient has one child, age 15.      Immunization History  Administered            Date(s) Administered    Influenza             09/13/20 abdominal pain, constipation, diarrhea, nausea and vomiting. Endocrine: Negative for cold intolerance and heat intolerance. Genitourinary: Negative for dysuria and hematuria. Musculoskeletal: Negative for back pain and joint swelling.    Skin: Negativ friction rub. No gallop. Pulmonary:      Effort: Pulmonary effort is normal. No respiratory distress. Breath sounds: Normal breath sounds. No wheezing, rhonchi or rales. Abdominal:      General: Bowel sounds are normal. There is no distension. a diet rich in fruits and vegetables. Vitamin D deficiency     A/P patient with a history of vitamin D deficiency she takes vitamin D 2000 units/day. Lab  1) Vitamin D level           Annual physical exam     Normal exam.  Labs as ordered.   Skin

## 2021-09-03 DIAGNOSIS — I10 ESSENTIAL HYPERTENSION: ICD-10-CM

## 2021-09-03 RX ORDER — AMLODIPINE BESYLATE 10 MG/1
TABLET ORAL
Qty: 90 TABLET | Refills: 0 | Status: SHIPPED | OUTPATIENT
Start: 2021-09-03 | End: 2021-12-10

## 2021-09-03 NOTE — TELEPHONE ENCOUNTER
Please review.  Protocol Failed or has no Protocol  Requested Prescriptions   Pending Prescriptions Disp Refills    AMLODIPINE 10 MG Oral Tab [Pharmacy Med Name: AMLODIPINE BESYLATE 10MG TABLETS] 90 tablet 0     Sig: TAKE 1 TABLET(10 MG) BY MOUTH DAILY

## 2021-11-24 ENCOUNTER — LAB ENCOUNTER (OUTPATIENT)
Dept: LAB | Age: 51
End: 2021-11-24
Attending: NURSE PRACTITIONER
Payer: COMMERCIAL

## 2021-11-24 DIAGNOSIS — Z00.00 ANNUAL PHYSICAL EXAM: ICD-10-CM

## 2021-11-24 DIAGNOSIS — Z12.31 ENCOUNTER FOR SCREENING MAMMOGRAM FOR MALIGNANT NEOPLASM OF BREAST: ICD-10-CM

## 2021-11-24 PROCEDURE — 36415 COLL VENOUS BLD VENIPUNCTURE: CPT

## 2021-11-24 PROCEDURE — 81001 URINALYSIS AUTO W/SCOPE: CPT

## 2021-11-24 PROCEDURE — 80053 COMPREHEN METABOLIC PANEL: CPT

## 2021-11-24 PROCEDURE — 80061 LIPID PANEL: CPT

## 2021-11-24 PROCEDURE — 84443 ASSAY THYROID STIM HORMONE: CPT

## 2021-11-24 PROCEDURE — 82607 VITAMIN B-12: CPT

## 2021-11-24 PROCEDURE — 82306 VITAMIN D 25 HYDROXY: CPT

## 2021-11-24 PROCEDURE — 85025 COMPLETE CBC W/AUTO DIFF WBC: CPT

## 2021-12-10 DIAGNOSIS — I10 ESSENTIAL HYPERTENSION: ICD-10-CM

## 2021-12-11 RX ORDER — AMLODIPINE BESYLATE 10 MG/1
10 TABLET ORAL DAILY
Qty: 90 TABLET | Refills: 1 | Status: SHIPPED | OUTPATIENT
Start: 2021-12-11 | End: 2022-06-09

## 2021-12-11 NOTE — TELEPHONE ENCOUNTER
Refill passed per [employer] protocol. Requested Prescriptions   Pending Prescriptions Disp Refills    amLODIPine 10 MG Oral Tab 90 tablet 0     Sig: Take 1 tablet (10 mg total) by mouth daily. Hypertensive Medications Protocol Passed - 12/10/2021  6:31 PM        Passed - CMP or BMP in past 12 months        Passed - Appointment in past 6 or next 3 months        Passed - GFR  > 50     Lab Results   Component Value Date    GFRAA 76 11/24/2021                      Recent Outpatient Visits              3 months ago Encounter for screening mammogram for malignant neoplasm of breast    [facility]ihai [facility], [employer] Visit    8 months ago Essential hypertension    [facility] [employer] [place], [place], [facility]    Office Visit    11 months ago Pruritus    [facility]    [facility]icine    1 year ago Elevated LFTs    Anika Nova Budd Ferrari, MD    Office Visit    1 year ago Encounter for screening colonoscopy    Franky Nova MD    Office Visit           Future Appointments         Provider Department Appt Notes    In 2 weeks [address] Nothing.

## 2021-12-13 DIAGNOSIS — I10 ESSENTIAL HYPERTENSION: ICD-10-CM

## 2021-12-13 RX ORDER — AMLODIPINE BESYLATE 10 MG/1
10 TABLET ORAL DAILY
Qty: 90 TABLET | Refills: 1 | OUTPATIENT
Start: 2021-12-13

## 2021-12-13 NOTE — TELEPHONE ENCOUNTER
Duplicate request, previously addressed. See 12/11/21 Refill Request encounter. Outpatient Medication Detail     Disp Refills Start End    amLODIPine 10 MG Oral Tab 90 tablet 1 12/11/2021     Sig - Route: Take 1 tablet (10 mg total) by mouth daily.  -

## 2021-12-28 ENCOUNTER — HOSPITAL ENCOUNTER (OUTPATIENT)
Dept: MAMMOGRAPHY | Age: 51
Discharge: HOME | End: 2021-12-28
Attending: NURSE PRACTITIONER
Payer: COMMERCIAL

## 2021-12-28 DIAGNOSIS — Z12.31 ENCOUNTER FOR SCREENING MAMMOGRAM FOR MALIGNANT NEOPLASM OF BREAST: ICD-10-CM

## 2021-12-28 PROCEDURE — 77063 BREAST TOMOSYNTHESIS BI: CPT | Performed by: NURSE PRACTITIONER

## 2021-12-28 PROCEDURE — 77067 SCR MAMMO BI INCL CAD: CPT | Performed by: NURSE PRACTITIONER

## 2022-03-22 ENCOUNTER — NURSE ONLY (OUTPATIENT)
Dept: LAB | Age: 52
End: 2022-03-22
Attending: NURSE PRACTITIONER
Payer: COMMERCIAL

## 2022-03-22 ENCOUNTER — TELEMEDICINE (OUTPATIENT)
Dept: INTERNAL MEDICINE CLINIC | Facility: CLINIC | Age: 52
End: 2022-03-22
Payer: COMMERCIAL

## 2022-03-22 DIAGNOSIS — B34.9 VIRAL SYNDROME: ICD-10-CM

## 2022-03-22 DIAGNOSIS — N30.00 ACUTE CYSTITIS WITHOUT HEMATURIA: Primary | ICD-10-CM

## 2022-03-22 DIAGNOSIS — R39.9 UTI SYMPTOMS: ICD-10-CM

## 2022-03-22 LAB — SARS-COV-2 RNA RESP QL NAA+PROBE: NOT DETECTED

## 2022-03-22 PROCEDURE — 99214 OFFICE O/P EST MOD 30 MIN: CPT | Performed by: NURSE PRACTITIONER

## 2022-03-22 NOTE — ASSESSMENT & PLAN NOTE
Patient complaining of pressure in her bladder for several days. She denies any fever, blood, foul-smelling urine, or burning. She does have intense pressure when she urinates. This morning when she woke up she had chills, body aches, nausea and vomited x1. She is requesting a COVID-19 test.  UTI infection versus COVID-19 viral syndrome. Patient has been hydrating and states her urine is clear in color.     Plan  Continue to hydrate  COVID-19 test  Urinalysis with reflex to culture    Patient to call if symptoms worsen

## 2022-03-23 ENCOUNTER — LAB ENCOUNTER (OUTPATIENT)
Dept: LAB | Age: 52
End: 2022-03-23
Attending: NURSE PRACTITIONER
Payer: COMMERCIAL

## 2022-03-23 DIAGNOSIS — N30.00 ACUTE CYSTITIS WITHOUT HEMATURIA: ICD-10-CM

## 2022-03-23 LAB
BILIRUB UR QL: NEGATIVE
COLOR UR: YELLOW
GLUCOSE UR-MCNC: NEGATIVE MG/DL
KETONES UR-MCNC: NEGATIVE MG/DL
NITRITE UR QL STRIP.AUTO: NEGATIVE
PH UR: 7 [PH] (ref 5–8)
PROT UR-MCNC: 30 MG/DL
SP GR UR STRIP: 1.01 (ref 1–1.03)
UROBILINOGEN UR STRIP-ACNC: <2
VIT C UR-MCNC: NEGATIVE MG/DL
WBC #/AREA URNS AUTO: >50 /HPF
WBC CLUMPS UR QL AUTO: PRESENT /HPF

## 2022-03-23 PROCEDURE — 81001 URINALYSIS AUTO W/SCOPE: CPT

## 2022-03-23 PROCEDURE — 87086 URINE CULTURE/COLONY COUNT: CPT

## 2022-06-04 ENCOUNTER — IMMUNIZATION (OUTPATIENT)
Dept: LAB | Age: 52
End: 2022-06-04
Attending: EMERGENCY MEDICINE
Payer: COMMERCIAL

## 2022-06-04 DIAGNOSIS — Z23 NEED FOR VACCINATION: Primary | ICD-10-CM

## 2022-06-04 PROCEDURE — 0064A SARSCOV2 VAC 50MCG/0.25ML IM: CPT

## 2022-06-09 DIAGNOSIS — I10 ESSENTIAL HYPERTENSION: ICD-10-CM

## 2022-06-09 RX ORDER — AMLODIPINE BESYLATE 10 MG/1
TABLET ORAL
Qty: 90 TABLET | Refills: 1 | Status: SHIPPED | OUTPATIENT
Start: 2022-06-09

## 2022-06-21 ENCOUNTER — TELEPHONE (OUTPATIENT)
Dept: GASTROENTEROLOGY | Facility: CLINIC | Age: 52
End: 2022-06-21

## 2022-06-21 NOTE — TELEPHONE ENCOUNTER
Entered into Epic. Recall CLN in 10 years per Dr. Manoj Bach. Last CLN done 05/27/2021. Recall entered into Patient Outreach for 05/27/2031. Health Maintenance updated.

## 2022-06-21 NOTE — TELEPHONE ENCOUNTER
----- Message from Jessica Farrar MD sent at 6/21/2022  3:41 PM CDT -----  GI staff: please place recall in for colonoscopy 5/2031

## 2022-09-01 ENCOUNTER — OFFICE VISIT (OUTPATIENT)
Dept: INTERNAL MEDICINE CLINIC | Facility: CLINIC | Age: 52
End: 2022-09-01
Payer: COMMERCIAL

## 2022-09-01 VITALS
BODY MASS INDEX: 43.54 KG/M2 | DIASTOLIC BLOOD PRESSURE: 83 MMHG | SYSTOLIC BLOOD PRESSURE: 136 MMHG | HEART RATE: 88 BPM | HEIGHT: 64 IN | WEIGHT: 255 LBS

## 2022-09-01 DIAGNOSIS — Z00.00 ROUTINE ADULT HEALTH MAINTENANCE: ICD-10-CM

## 2022-09-01 DIAGNOSIS — Z00.00 ANNUAL PHYSICAL EXAM: ICD-10-CM

## 2022-09-01 DIAGNOSIS — Z12.31 ENCOUNTER FOR SCREENING MAMMOGRAM FOR MALIGNANT NEOPLASM OF BREAST: ICD-10-CM

## 2022-09-01 DIAGNOSIS — I10 ESSENTIAL HYPERTENSION: ICD-10-CM

## 2022-09-01 DIAGNOSIS — Z12.4 CERVICAL CANCER SCREENING: Primary | ICD-10-CM

## 2022-09-01 PROCEDURE — 87624 HPV HI-RISK TYP POOLED RSLT: CPT | Performed by: NURSE PRACTITIONER

## 2022-09-01 RX ORDER — AMLODIPINE BESYLATE 10 MG/1
10 TABLET ORAL DAILY
Qty: 90 TABLET | Refills: 1 | Status: SHIPPED | OUTPATIENT
Start: 2022-09-01

## 2022-09-02 LAB — HPV I/H RISK 1 DNA SPEC QL NAA+PROBE: NEGATIVE

## 2022-11-29 ENCOUNTER — OFFICE VISIT (OUTPATIENT)
Dept: INTERNAL MEDICINE CLINIC | Facility: CLINIC | Age: 52
End: 2022-11-29
Payer: COMMERCIAL

## 2022-11-29 VITALS
BODY MASS INDEX: 43.71 KG/M2 | HEART RATE: 85 BPM | HEIGHT: 64 IN | SYSTOLIC BLOOD PRESSURE: 143 MMHG | DIASTOLIC BLOOD PRESSURE: 80 MMHG | WEIGHT: 256 LBS

## 2022-11-29 DIAGNOSIS — Z00.00 ANNUAL PHYSICAL EXAM: Primary | ICD-10-CM

## 2022-11-29 DIAGNOSIS — R21 RASH: ICD-10-CM

## 2022-11-29 PROCEDURE — 3077F SYST BP >= 140 MM HG: CPT | Performed by: NURSE PRACTITIONER

## 2022-11-29 PROCEDURE — 3008F BODY MASS INDEX DOCD: CPT | Performed by: NURSE PRACTITIONER

## 2022-11-29 PROCEDURE — 99213 OFFICE O/P EST LOW 20 MIN: CPT | Performed by: NURSE PRACTITIONER

## 2022-11-29 PROCEDURE — 3079F DIAST BP 80-89 MM HG: CPT | Performed by: NURSE PRACTITIONER

## 2022-11-29 RX ORDER — CLOTRIMAZOLE AND BETAMETHASONE DIPROPIONATE 10; .64 MG/G; MG/G
1 CREAM TOPICAL 2 TIMES DAILY PRN
Qty: 15 G | Refills: 3 | Status: SHIPPED | OUTPATIENT
Start: 2022-11-29

## 2022-12-29 ENCOUNTER — HOSPITAL ENCOUNTER (OUTPATIENT)
Dept: MAMMOGRAPHY | Facility: HOSPITAL | Age: 52
Discharge: HOME | End: 2022-12-29
Attending: NURSE PRACTITIONER
Payer: COMMERCIAL

## 2022-12-29 DIAGNOSIS — Z12.31 ENCOUNTER FOR SCREENING MAMMOGRAM FOR MALIGNANT NEOPLASM OF BREAST: ICD-10-CM

## 2022-12-29 PROCEDURE — 77063 BREAST TOMOSYNTHESIS BI: CPT | Performed by: NURSE PRACTITIONER

## 2022-12-29 PROCEDURE — 77067 SCR MAMMO BI INCL CAD: CPT | Performed by: NURSE PRACTITIONER

## 2023-01-05 ENCOUNTER — TELEMEDICINE (OUTPATIENT)
Dept: INTERNAL MEDICINE CLINIC | Facility: CLINIC | Age: 53
End: 2023-01-05

## 2023-01-05 DIAGNOSIS — I10 ESSENTIAL HYPERTENSION: ICD-10-CM

## 2023-01-05 DIAGNOSIS — J01.00 SUBACUTE MAXILLARY SINUSITIS: Primary | ICD-10-CM

## 2023-01-05 PROCEDURE — 99214 OFFICE O/P EST MOD 30 MIN: CPT | Performed by: NURSE PRACTITIONER

## 2023-01-05 RX ORDER — BENZONATATE 100 MG/1
100 CAPSULE ORAL 3 TIMES DAILY PRN
Qty: 20 CAPSULE | Refills: 0 | Status: SHIPPED | OUTPATIENT
Start: 2023-01-05 | End: 2023-01-12

## 2023-01-05 RX ORDER — AZITHROMYCIN 250 MG/1
TABLET, FILM COATED ORAL
Qty: 6 TABLET | Refills: 0 | Status: SHIPPED | OUTPATIENT
Start: 2023-01-05 | End: 2023-01-10

## 2023-01-05 NOTE — ASSESSMENT & PLAN NOTE
Patient with Hx of hypertension. She is on amlodipine 10mg daily. She has been taking her B/P medication. B/P 134/79  Plan  Continue Amlodipine 10mg po daily.   Follow low sodium diet

## 2023-01-05 NOTE — ASSESSMENT & PLAN NOTE
Patient started with flu symptoms around [place]. She is having worsening symptoms with post nasal drip and worsening maxillary sinus pain. Patient with flu symptoms now with sinus pain and pressure. Plan  Hydrate with fluids  Tylenol two tabs every six hours. . Not to exceed 4 grams in one day. Drink Hot tea with lemon  Drink Hot tea with honey  Gargle with warm salt water if you have a sore throat. Benzonatate 100 mg po three times per day as needed for cough. Zpak  Apply warm compresses over sinus area.

## 2023-01-09 ENCOUNTER — LAB ENCOUNTER (OUTPATIENT)
Dept: LAB | Facility: HOSPITAL | Age: 53
End: 2023-01-09
Attending: NURSE PRACTITIONER
Payer: COMMERCIAL

## 2023-01-09 DIAGNOSIS — Z00.00 ANNUAL PHYSICAL EXAM: ICD-10-CM

## 2023-01-09 LAB
ALBUMIN SERPL-MCNC: 3.4 G/DL (ref 3.4–5)
ALBUMIN/GLOB SERPL: 0.6 {RATIO} (ref 1–2)
ALP LIVER SERPL-CCNC: 125 U/L
ALT SERPL-CCNC: 16 U/L
ANION GAP SERPL CALC-SCNC: 4 MMOL/L (ref 0–18)
AST SERPL-CCNC: 15 U/L (ref 15–37)
BASOPHILS # BLD AUTO: 0.12 X10(3) UL (ref 0–0.2)
BASOPHILS NFR BLD AUTO: 1.2 %
BILIRUB SERPL-MCNC: 0.4 MG/DL (ref 0.1–2)
BUN BLD-MCNC: 11 MG/DL (ref 7–18)
BUN/CREAT SERPL: 7.9 (ref 10–20)
CALCIUM BLD-MCNC: 10.3 MG/DL (ref 8.5–10.1)
CHLORIDE SERPL-SCNC: 106 MMOL/L (ref 98–112)
CHOLEST SERPL-MCNC: 203 MG/DL (ref ?–200)
CO2 SERPL-SCNC: 30 MMOL/L (ref 21–32)
CREAT BLD-MCNC: 1.39 MG/DL
DEPRECATED RDW RBC AUTO: 43.3 FL (ref 35.1–46.3)
EOSINOPHIL # BLD AUTO: 0.38 X10(3) UL (ref 0–0.7)
EOSINOPHIL NFR BLD AUTO: 3.8 %
ERYTHROCYTE [DISTWIDTH] IN BLOOD BY AUTOMATED COUNT: 13.4 % (ref 11–15)
EST. AVERAGE GLUCOSE BLD GHB EST-MCNC: 117 MG/DL (ref 68–126)
FASTING PATIENT LIPID ANSWER: YES
FASTING STATUS PATIENT QL REPORTED: YES
GFR SERPLBLD BASED ON 1.73 SQ M-ARVRAT: 45 ML/MIN/1.73M2 (ref 60–?)
GLOBULIN PLAS-MCNC: 5.7 G/DL (ref 2.8–4.4)
GLUCOSE BLD-MCNC: 95 MG/DL (ref 70–99)
HBA1C MFR BLD: 5.7 % (ref ?–5.7)
HCT VFR BLD AUTO: 38 %
HDLC SERPL-MCNC: 41 MG/DL (ref 40–59)
HGB BLD-MCNC: 11.7 G/DL
IMM GRANULOCYTES # BLD AUTO: 0.04 X10(3) UL (ref 0–1)
IMM GRANULOCYTES NFR BLD: 0.4 %
LDLC SERPL CALC-MCNC: 151 MG/DL (ref ?–100)
LYMPHOCYTES # BLD AUTO: 3.43 X10(3) UL (ref 1–4)
LYMPHOCYTES NFR BLD AUTO: 34 %
MCH RBC QN AUTO: 26.9 PG (ref 26–34)
MCHC RBC AUTO-ENTMCNC: 30.8 G/DL (ref 31–37)
MCV RBC AUTO: 87.4 FL
MONOCYTES # BLD AUTO: 0.73 X10(3) UL (ref 0.1–1)
MONOCYTES NFR BLD AUTO: 7.2 %
NEUTROPHILS # BLD AUTO: 5.39 X10 (3) UL (ref 1.5–7.7)
NEUTROPHILS # BLD AUTO: 5.39 X10(3) UL (ref 1.5–7.7)
NEUTROPHILS NFR BLD AUTO: 53.4 %
NONHDLC SERPL-MCNC: 162 MG/DL (ref ?–130)
OSMOLALITY SERPL CALC.SUM OF ELEC: 289 MOSM/KG (ref 275–295)
PLATELET # BLD AUTO: 399 10(3)UL (ref 150–450)
POTASSIUM SERPL-SCNC: 4.5 MMOL/L (ref 3.5–5.1)
PROT SERPL-MCNC: 9.1 G/DL (ref 6.4–8.2)
RBC # BLD AUTO: 4.35 X10(6)UL
SODIUM SERPL-SCNC: 140 MMOL/L (ref 136–145)
TRIGL SERPL-MCNC: 60 MG/DL (ref 30–149)
TSI SER-ACNC: 3.68 MIU/ML (ref 0.36–3.74)
VIT B12 SERPL-MCNC: 508 PG/ML (ref 193–986)
VIT D+METAB SERPL-MCNC: 37.3 NG/ML (ref 30–100)
VLDLC SERPL CALC-MCNC: 11 MG/DL (ref 0–30)
WBC # BLD AUTO: 10.1 X10(3) UL (ref 4–11)

## 2023-01-09 PROCEDURE — 80053 COMPREHEN METABOLIC PANEL: CPT

## 2023-01-09 PROCEDURE — 84443 ASSAY THYROID STIM HORMONE: CPT

## 2023-01-09 PROCEDURE — 82306 VITAMIN D 25 HYDROXY: CPT

## 2023-01-09 PROCEDURE — 82607 VITAMIN B-12: CPT

## 2023-01-09 PROCEDURE — 85025 COMPLETE CBC W/AUTO DIFF WBC: CPT

## 2023-01-09 PROCEDURE — 36415 COLL VENOUS BLD VENIPUNCTURE: CPT

## 2023-01-09 PROCEDURE — 83036 HEMOGLOBIN GLYCOSYLATED A1C: CPT

## 2023-01-09 PROCEDURE — 80061 LIPID PANEL: CPT

## 2023-03-06 DIAGNOSIS — I10 ESSENTIAL HYPERTENSION: ICD-10-CM

## 2023-03-07 RX ORDER — AMLODIPINE BESYLATE 10 MG/1
TABLET ORAL
Qty: 90 TABLET | Refills: 1 | Status: SHIPPED | OUTPATIENT
Start: 2023-03-07

## 2023-03-07 NOTE — TELEPHONE ENCOUNTER
Received call from David at [facility]. Patient's date of birth and full name both confirmed. She is asking for refill. RN advised we received request yesterday. And It will be reviewed and processed by our team within 48-72 business hours. She verbalizes understanding of all information, and agreeable to plan.

## 2023-04-14 ENCOUNTER — APPOINTMENT (OUTPATIENT)
Dept: GENERAL RADIOLOGY | Age: 53
End: 2023-04-14
Attending: NURSE PRACTITIONER
Payer: COMMERCIAL

## 2023-04-14 ENCOUNTER — HOSPITAL ENCOUNTER (OUTPATIENT)
Age: 53
Discharge: HOME | End: 2023-04-14
Payer: COMMERCIAL

## 2023-04-14 VITALS
TEMPERATURE: 98 F | DIASTOLIC BLOOD PRESSURE: 88 MMHG | BODY MASS INDEX: 41.83 KG/M2 | HEART RATE: 87 BPM | RESPIRATION RATE: 16 BRPM | SYSTOLIC BLOOD PRESSURE: 145 MMHG | WEIGHT: 245 LBS | HEIGHT: 64 IN | OXYGEN SATURATION: 100 %

## 2023-04-14 DIAGNOSIS — S80.02XA CONTUSION OF LEFT KNEE, INITIAL ENCOUNTER: Primary | ICD-10-CM

## 2023-04-14 PROCEDURE — 99213 OFFICE O/P EST LOW 20 MIN: CPT | Performed by: NURSE PRACTITIONER

## 2023-04-14 PROCEDURE — 73562 X-RAY EXAM OF KNEE 3: CPT | Performed by: NURSE PRACTITIONER
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
## 2023-04-17 ENCOUNTER — OFFICE VISIT (OUTPATIENT)
Dept: INTERNAL MEDICINE CLINIC | Facility: CLINIC | Age: 53
End: 2023-04-17

## 2023-04-17 VITALS
WEIGHT: 251 LBS | DIASTOLIC BLOOD PRESSURE: 70 MMHG | HEART RATE: 92 BPM | BODY MASS INDEX: 42.85 KG/M2 | SYSTOLIC BLOOD PRESSURE: 132 MMHG | HEIGHT: 64 IN

## 2023-04-17 DIAGNOSIS — S80.02XA HEMATOMA OF LEFT KNEE REGION: Primary | ICD-10-CM

## 2023-04-17 PROCEDURE — 3075F SYST BP GE 130 - 139MM HG: CPT | Performed by: NURSE PRACTITIONER

## 2023-04-17 PROCEDURE — 3078F DIAST BP <80 MM HG: CPT | Performed by: NURSE PRACTITIONER

## 2023-04-17 PROCEDURE — 99214 OFFICE O/P EST MOD 30 MIN: CPT | Performed by: NURSE PRACTITIONER

## 2023-04-17 PROCEDURE — 3008F BODY MASS INDEX DOCD: CPT | Performed by: NURSE PRACTITIONER
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
## 2023-05-01 ENCOUNTER — TELEMEDICINE (OUTPATIENT)
Dept: INTERNAL MEDICINE CLINIC | Facility: CLINIC | Age: 53
End: 2023-05-01

## 2023-05-01 DIAGNOSIS — S80.02XA HEMATOMA OF LEFT KNEE REGION: ICD-10-CM

## 2023-05-01 DIAGNOSIS — M25.562 ACUTE PAIN OF BOTH KNEES: Primary | ICD-10-CM

## 2023-05-01 DIAGNOSIS — M25.561 ACUTE PAIN OF BOTH KNEES: Primary | ICD-10-CM

## 2023-05-01 NOTE — ASSESSMENT & PLAN NOTE
Continues with knee pain left greater than right. Plan  Ortho Consultation   Tylenol two tabs every eight hours. . Not to exceed 4 grams in one day.    Advil 200 mg po every eight hours

## 2023-05-12 NOTE — TELEPHONE ENCOUNTER
Dr Ortiz Grade please see patient request for a colonoscopy below. LOV with you was in 2018. Patient has last seen MAYNOR Spencer 08/2019. No past orders for a colonoscopy listed. Detail Level: Detailed

## 2023-09-02 DIAGNOSIS — I10 ESSENTIAL HYPERTENSION: ICD-10-CM

## 2023-09-02 RX ORDER — AMLODIPINE BESYLATE 10 MG/1
10 TABLET ORAL DAILY
Qty: 90 TABLET | Refills: 3 | Status: SHIPPED | OUTPATIENT
Start: 2023-09-02

## 2023-09-11 ENCOUNTER — OFFICE VISIT (OUTPATIENT)
Dept: ORTHOPEDICS CLINIC | Facility: CLINIC | Age: 53
End: 2023-09-11

## 2023-09-11 VITALS
HEART RATE: 83 BPM | WEIGHT: 247 LBS | BODY MASS INDEX: 42.17 KG/M2 | HEIGHT: 64 IN | DIASTOLIC BLOOD PRESSURE: 72 MMHG | SYSTOLIC BLOOD PRESSURE: 121 MMHG

## 2023-09-11 DIAGNOSIS — M23.92 INTERNAL DERANGEMENT OF LEFT KNEE: Primary | ICD-10-CM

## 2023-09-11 DIAGNOSIS — M17.12 PRIMARY OSTEOARTHRITIS OF LEFT KNEE: ICD-10-CM

## 2023-09-11 DIAGNOSIS — E66.01 MORBID OBESITY WITH BMI OF 40.0-44.9, ADULT (HCC): ICD-10-CM

## 2023-09-11 RX ORDER — MELOXICAM 15 MG/1
15 TABLET ORAL DAILY
Qty: 30 TABLET | Refills: 0 | Status: SHIPPED | OUTPATIENT
Start: 2023-09-11

## 2023-09-11 RX ORDER — TRIAMCINOLONE ACETONIDE 40 MG/ML
40 INJECTION, SUSPENSION INTRA-ARTICULAR; INTRAMUSCULAR ONCE
Status: COMPLETED | OUTPATIENT
Start: 2023-09-11 | End: 2023-09-11

## 2023-09-11 RX ADMIN — TRIAMCINOLONE ACETONIDE 40 MG: 40 INJECTION, SUSPENSION INTRA-ARTICULAR; INTRAMUSCULAR at 12:35:00

## 2023-09-11 NOTE — PROGRESS NOTES
Per verbal order from Dr. Douglas Barker, draw up and 4ml of 0.5% Marcaine and 1ml of Kenalog 40 for injection into left knee. Briana Montoya  Patient provided education handout for cortisone injection.

## 2023-10-02 ENCOUNTER — TELEPHONE (OUTPATIENT)
Dept: INTERNAL MEDICINE CLINIC | Facility: CLINIC | Age: 53
End: 2023-10-02

## 2023-10-02 NOTE — TELEPHONE ENCOUNTER
Patient due for mammogram on 12/29/23 and physical. Care Gap letter generated and sent to patient via [address]

## 2023-11-20 ENCOUNTER — OFFICE VISIT (OUTPATIENT)
Dept: INTERNAL MEDICINE CLINIC | Facility: CLINIC | Age: 53
End: 2023-11-20
Payer: COMMERCIAL

## 2023-11-20 VITALS
SYSTOLIC BLOOD PRESSURE: 130 MMHG | WEIGHT: 239 LBS | HEIGHT: 64 IN | HEART RATE: 90 BPM | DIASTOLIC BLOOD PRESSURE: 80 MMHG | BODY MASS INDEX: 40.8 KG/M2

## 2023-11-20 DIAGNOSIS — R21 RASH: ICD-10-CM

## 2023-11-20 DIAGNOSIS — B34.9 VIRAL SYNDROME: ICD-10-CM

## 2023-11-20 DIAGNOSIS — Z13.1 SCREENING FOR DIABETES MELLITUS: ICD-10-CM

## 2023-11-20 DIAGNOSIS — Z12.31 VISIT FOR SCREENING MAMMOGRAM: ICD-10-CM

## 2023-11-20 DIAGNOSIS — E55.9 VITAMIN D DEFICIENCY: ICD-10-CM

## 2023-11-20 DIAGNOSIS — Z13.6 ENCOUNTER FOR SCREENING FOR CORONARY ARTERY DISEASE: ICD-10-CM

## 2023-11-20 DIAGNOSIS — Z00.00 ANNUAL PHYSICAL EXAM: Primary | ICD-10-CM

## 2023-11-20 DIAGNOSIS — I10 ESSENTIAL HYPERTENSION: ICD-10-CM

## 2023-11-20 PROCEDURE — 3079F DIAST BP 80-89 MM HG: CPT | Performed by: NURSE PRACTITIONER

## 2023-11-20 PROCEDURE — 3008F BODY MASS INDEX DOCD: CPT | Performed by: NURSE PRACTITIONER

## 2023-11-20 PROCEDURE — 99396 PREV VISIT EST AGE 40-64: CPT | Performed by: NURSE PRACTITIONER

## 2023-11-20 PROCEDURE — 3075F SYST BP GE 130 - 139MM HG: CPT | Performed by: NURSE PRACTITIONER

## 2023-11-20 RX ORDER — CLOTRIMAZOLE AND BETAMETHASONE DIPROPIONATE 10; .64 MG/G; MG/G
1 CREAM TOPICAL 2 TIMES DAILY PRN
Qty: 15 G | Refills: 3 | Status: SHIPPED | OUTPATIENT
Start: 2023-11-20

## 2023-11-20 NOTE — ASSESSMENT & PLAN NOTE
Rash- Intermittent rash- None at this visit      Plan- Refill requested  clotrimazole-betamethasone 1-0.05 % External Cream Apply 1 Application topically 2 (two) times daily as needed.  15 g      Annual lab ordered

## 2023-11-20 NOTE — ASSESSMENT & PLAN NOTE
Normal exam.  Labs as ordered. Skin check normal.  No significant abnormal nevi. Breast exam completed-no palpable abnormalities, discharge from the nipples or axillary adenopathy. No cervical or inguinal lymphadenopathy. Hernial orifices intact. Pelvic exam completed-no cervical movement tenderness, adnexal palpable abnormalities. No cystocele, rectocele or uterine descent. Pap completed. Dur in 2025  Colonoscopy- Due in 2024.

## 2023-11-20 NOTE — ASSESSMENT & PLAN NOTE
Patient with Hx of hypertension. She is on amlodipine 10mg daily. She has been taking her B/P medication. Blood pressure 130/80, pulse 90, height 5' 4\" (1.626 m), weight 239 lb (108.4 kg), not currently breastfeeding. Plan  Continue Amlodipine 10mg po daily.   Follow low sodium diet

## 2023-12-02 NOTE — ASSESSMENT & PLAN NOTE
Rash- Most likely fungal rash in groin, breast and upper back. Itchy scaly slightly raised eruption. Does not appear to look like singles. No pain or burning. Plan  clotrimazole-betamethasone 1-0.05 % External Cream Apply 1 Application topically 2 (two) times daily as needed.  15 g     Annual lab ordered
Syncope due to orthostatic hypotension

## 2023-12-30 ENCOUNTER — HOSPITAL ENCOUNTER (OUTPATIENT)
Dept: MAMMOGRAPHY | Age: 53
Discharge: HOME | End: 2023-12-30
Attending: NURSE PRACTITIONER
Payer: COMMERCIAL

## 2023-12-30 DIAGNOSIS — Z12.31 VISIT FOR SCREENING MAMMOGRAM: ICD-10-CM

## 2023-12-30 PROCEDURE — 77067 SCR MAMMO BI INCL CAD: CPT | Performed by: NURSE PRACTITIONER

## 2023-12-30 PROCEDURE — 77063 BREAST TOMOSYNTHESIS BI: CPT | Performed by: NURSE PRACTITIONER

## 2023-12-31 ENCOUNTER — TELEPHONE (OUTPATIENT)
Dept: INTERNAL MEDICINE CLINIC | Facility: CLINIC | Age: 53
End: 2023-12-31

## 2024-01-02 ENCOUNTER — PATIENT MESSAGE (OUTPATIENT)
Dept: INTERNAL MEDICINE CLINIC | Facility: CLINIC | Age: 54
End: 2024-01-02

## 2024-01-02 RX ORDER — BENZONATATE 100 MG/1
100 CAPSULE ORAL 3 TIMES DAILY PRN
Qty: 20 CAPSULE | Refills: 0 | OUTPATIENT
Start: 2024-01-02

## 2024-01-02 NOTE — TELEPHONE ENCOUNTER
From: Christine Francis  To: Bess Farah  Sent: 1/2/2024 8:44 AM CST  Subject: Additional images of my left breast    I received my mammography results and the request additional images. Please provide an updated referral so that I can schedule the screening. Thank you.

## 2024-01-03 ENCOUNTER — TELEMEDICINE (OUTPATIENT)
Dept: INTERNAL MEDICINE CLINIC | Facility: CLINIC | Age: 54
End: 2024-01-03

## 2024-01-03 DIAGNOSIS — R05.1 ACUTE COUGH: Primary | ICD-10-CM

## 2024-01-03 PROCEDURE — 99213 OFFICE O/P EST LOW 20 MIN: CPT | Performed by: INTERNAL MEDICINE

## 2024-01-03 RX ORDER — BENZONATATE 200 MG/1
200 CAPSULE ORAL 3 TIMES DAILY PRN
Qty: 30 CAPSULE | Refills: 1 | Status: SHIPPED | OUTPATIENT
Start: 2024-01-03

## 2024-01-03 NOTE — PROGRESS NOTES
Patient ID: Christine Francis is a 53 year old female.  Chief Complaint   Patient presents with    Sick Call          HISTORY OF PRESENT ILLNESS:   Patient presents for above.  This visit is conducted using Telemedicine with live, interactive video and audio.  Patient with a weeklong history of cough.  Cough can be all day long.  No fevers or chills.  Has been using over-the-counter treatment without improvement.  Had similar symptoms 1 year ago and Tessalon Perles were effective.    Review of Systems   Constitutional: Negative.    HENT: Negative.     Eyes: Negative.    Respiratory:  Positive for cough.    Cardiovascular: Negative.    Endocrine: Negative.    Genitourinary: Negative.    Musculoskeletal: Negative.    Allergic/Immunologic: Negative.    Neurological: Negative.    Hematological: Negative.       MEDICAL HISTORY:     Past Medical History:   Diagnosis Date    Essential hypertension     Fibroids     surgery       Past Surgical History:   Procedure Laterality Date          COLONOSCOPY N/A 2021    Procedure: COLONOSCOPY;  Surgeon: Kelechi Sanabria MD;  Location: [facility] ENDOSCOPY    OTHER SURGICAL HISTORY  2015    Root Canal Extraction         Current Outpatient Medications:     benzonatate 200 MG Oral Cap, Take 1 capsule (200 mg total) by mouth 3 (three) times daily as needed for cough., Disp: 30 capsule, Rfl: 1    clotrimazole-betamethasone 1-0.05 % External Cream, Apply 1 Application  topically 2 (two) times daily as needed., Disp: 15 g, Rfl: 3    amLODIPine 10 MG Oral Tab, Take 1 tablet (10 mg total) by mouth daily., Disp: 90 tablet, Rfl: 3    Vitamin D3 50 MCG (2000 UT) Oral Cap, Take 1 capsule (2,000 Units total) by mouth daily., Disp: , Rfl:     Allergies:No Known Allergies    Social History     Socioeconomic History    Marital status: Legally      Spouse name: Not on file    Number of children: Not on file    Years of education: Not on file    Highest  education level: Not on file   Occupational History    Not on file   Tobacco Use    Smoking status: Never    Smokeless tobacco: Never   Vaping Use    Vaping Use: Never used   Substance and Sexual Activity    Alcohol use: Yes     Comment: very rare     Drug use: No    Sexual activity: Never     Partners: Male   Other Topics Concern     Service Not Asked    Blood Transfusions Not Asked    Caffeine Concern Yes     Comment: coffee, 2 cups/day    Occupational Exposure Not Asked    Hobby Hazards Not Asked    Sleep Concern Not Asked    Stress Concern Not Asked    Weight Concern Not Asked    Special Diet Not Asked    Back Care Not Asked    Exercise Not Asked    Bike Helmet Not Asked    Seat Belt Not Asked    Self-Exams Not Asked   Social History Narrative    Not on file     Social Determinants of Health     Financial Resource Strain: Not on file   Food Insecurity: Not on file   Transportation Needs: Not on file   Physical Activity: Not on file   Stress: Not on file   Social Connections: Not on file   Housing Stability: Not on file       PHYSICAL EXAM:   Unable to perform vitals or do physical exam as this is a virtual video visit.  Patient appears alert.  No conversational dyspnea or distress.    ASSESSMENT/PLAN:   1. Acute cough  benzonatate 200 MG Oral Cap; Take 1 capsule (200 mg total) by mouth 3 (three) times daily as needed for cough.  Dispense: 30 capsule; Refill: 1  Begin Flonase.  Symptomatic treatment discussed.    Return if symptoms worsen or fail to improve.    Time spent on encounter  11 minutes   Video time 6 minutes   Documentation time 5 minutes     Christine Francis understands video evaluation is not a substitute for face-to-face examination or emergency care. Patient advised to go to ER or call 911 for worsening symptoms or acute distress.     Telehealth outside of [facility]  Telehealth Verbal Consent   I conducted a telehealth visit with Christine Francis today, 01/03/24,  which was completed using two-way, real-time interactive audio and video communication. This has been done in good munir to provide continuity of care in the best interest of the provider-patient relationship, due to the COVID -19 public health crisis/national emergency where restrictions of face-to-face office visits are ongoing. Every conscious effort was taken to allow for sufficient and adequate time to complete the visit.  The patient was made aware of the limitations of the telehealth visit, including treatment limitations as no physical exam could be performed.  The patient was advised to call 911 or to go to the ER in case there was an emergency.  The patient was also advised of the potential privacy & security concerns related to the telehealth platform.   The patient was made aware of where to find [facility]'s notice of privacy practices, telehealth consent form and other related consent forms and documents.  which are located on the [facility] website. The patient verbally agreed to telehealth consent form, related consents and the risks discussed.    Lastly, the patient confirmed that they were in [place].   Included in this visit, time may have been spent reviewing labs, medications, radiology tests and decision making. Appropriate medical decision-making and tests are ordered as detailed in the plan of care above.  Coding/billing information is submitted for this visit based on complexity of care and/or time spent for the visit.    This note was prepared using Dragon Medical voice recognition dictation software. As a result errors may occur. When identified these errors have been corrected. While every attempt is made to correct errors during dictation discrepancies may still exist.    Delonte Sandoval MD  1/3/2024

## 2024-01-10 ENCOUNTER — HOSPITAL ENCOUNTER (OUTPATIENT)
Dept: MAMMOGRAPHY | Facility: HOSPITAL | Age: 54
Discharge: HOME | End: 2024-01-10
Attending: NURSE PRACTITIONER
Payer: COMMERCIAL

## 2024-01-10 DIAGNOSIS — R92.2 INCONCLUSIVE MAMMOGRAM: ICD-10-CM

## 2024-01-10 PROCEDURE — 76642 ULTRASOUND BREAST LIMITED: CPT | Performed by: NURSE PRACTITIONER

## 2024-01-10 PROCEDURE — 77061 BREAST TOMOSYNTHESIS UNI: CPT | Performed by: NURSE PRACTITIONER

## 2024-01-10 PROCEDURE — 77065 DX MAMMO INCL CAD UNI: CPT | Performed by: NURSE PRACTITIONER

## 2024-02-15 ENCOUNTER — OFFICE VISIT (OUTPATIENT)
Dept: INTERNAL MEDICINE CLINIC | Facility: CLINIC | Age: 54
End: 2024-02-15
Payer: COMMERCIAL

## 2024-02-15 VITALS
BODY MASS INDEX: 39.78 KG/M2 | WEIGHT: 233 LBS | HEART RATE: 80 BPM | TEMPERATURE: 99 F | SYSTOLIC BLOOD PRESSURE: 128 MMHG | DIASTOLIC BLOOD PRESSURE: 76 MMHG | HEIGHT: 64 IN

## 2024-02-15 DIAGNOSIS — R50.81 FEVER IN OTHER DISEASES: Primary | ICD-10-CM

## 2024-02-15 PROBLEM — R50.9 FEVER: Status: ACTIVE | Noted: 2024-02-15

## 2024-02-15 PROCEDURE — 99213 OFFICE O/P EST LOW 20 MIN: CPT | Performed by: NURSE PRACTITIONER

## 2024-02-15 PROCEDURE — 3078F DIAST BP <80 MM HG: CPT | Performed by: NURSE PRACTITIONER

## 2024-02-15 PROCEDURE — 3074F SYST BP LT 130 MM HG: CPT | Performed by: NURSE PRACTITIONER

## 2024-02-15 PROCEDURE — 3008F BODY MASS INDEX DOCD: CPT | Performed by: NURSE PRACTITIONER

## 2024-02-15 RX ORDER — PREDNISONE 20 MG/1
TABLET ORAL
Qty: 8 TABLET | Refills: 0 | Status: SHIPPED | OUTPATIENT
Start: 2024-02-15

## 2024-02-15 RX ORDER — DOXYCYCLINE 100 MG/1
100 CAPSULE ORAL 2 TIMES DAILY
Qty: 14 CAPSULE | Refills: 0 | Status: SHIPPED | OUTPATIENT
Start: 2024-02-15 | End: 2024-02-22

## 2024-02-15 NOTE — ASSESSMENT & PLAN NOTE
Fever- Most likely viral syndrome with worsening symptoms and nausea.    Plan  Patient with flu symptoms.    Plan  Hydrate with fluids  Tylenol two tabs every six hours.. Not to exceed 4 grams in one day.  Drink Hot tea with lemon  Drink Hot tea with honey  Gargle with warm salt water if you have a sore throat.  Benzonatate 100 mg  does not work for her.  Prednisone 40 mg x 4 days.  Doxycycline 100 mg po BID.

## 2024-02-15 NOTE — PROGRESS NOTES
HPI:    Patient ID: Christine Francis is a 53 year old female.    HPI Flu like symptoms with cough and fever.for over 14 days. She is nauseated.  53 year old female who was in contact with someone who was ill.  She has been ill with chills, fever and nausea.  She has been coughing and has worsening symptoms.    Temp 99.2- Fever for over one week.  Immunization History   Administered Date(s) Administered    Covid-19 Vaccine Moderna 100 mcg/0.5 ml 2021, 2021, 2021, 2022    Covid-19 Vaccine Moderna 50 Mcg/0.25 Ml 2022    Influenza 2017, 2018    TDAP 2020       Past Medical History:   Diagnosis Date    Essential hypertension     Fibroids     surgery      Past Surgical History:   Procedure Laterality Date          Colonoscopy N/A 2021    Procedure: COLONOSCOPY;  Surgeon: Kelechi Sanabria MD;  Location: [facility] ENDOSCOPY    Other surgical history  2015    Root Canal Extraction      Social History     Socioeconomic History    Marital status: Legally    Tobacco Use    Smoking status: Never    Smokeless tobacco: Never   Vaping Use    Vaping Use: Never used   Substance and Sexual Activity    Alcohol use: Yes     Comment: very rare     Drug use: No    Sexual activity: Never     Partners: Male   Other Topics Concern    Caffeine Concern Yes     Comment: coffee, 2 cups/day          Review of Systems   Constitutional:  Positive for chills. Negative for fatigue and fever.   HENT:  Positive for sinus pain. Negative for ear pain, hearing loss, sore throat and trouble swallowing.    Eyes:  Negative for pain and visual disturbance.   Respiratory:  Positive for cough, chest tightness and shortness of breath.    Cardiovascular:  Negative for chest pain, palpitations and leg swelling.   Gastrointestinal:  Positive for nausea. Negative for abdominal pain, constipation, diarrhea and vomiting.   Endocrine: Negative for cold intolerance and heat  intolerance.   Genitourinary:  Negative for dysuria and hematuria.   Musculoskeletal:  Negative for back pain and joint swelling.   Skin:  Negative for rash.   Allergic/Immunologic: Negative for environmental allergies.   Neurological:  Positive for headaches. Negative for weakness and numbness.   Hematological:  Does not bruise/bleed easily.   Psychiatric/Behavioral:  Negative for dysphoric mood and sleep disturbance. The patient is not nervous/anxious.               Current Outpatient Medications   Medication Sig Dispense Refill    Doxycycline Monohydrate 100 MG Oral Cap Take 1 capsule (100 mg total) by mouth 2 (two) times daily for 7 days. 14 capsule 0    predniSONE 20 MG Oral Tab 2 tabs daily for 4 days. 8 tablet 0    clotrimazole-betamethasone 1-0.05 % External Cream Apply 1 Application  topically 2 (two) times daily as needed. 15 g 3    amLODIPine 10 MG Oral Tab Take 1 tablet (10 mg total) by mouth daily. 90 tablet 3    benzonatate 200 MG Oral Cap Take 1 capsule (200 mg total) by mouth 3 (three) times daily as needed for cough. (Patient not taking: Reported on 2/15/2024) 30 capsule 1    Vitamin D3 50 MCG (2000 UT) Oral Cap Take 1 capsule (2,000 Units total) by mouth daily. (Patient not taking: Reported on 2/15/2024)       Allergies:No Known Allergies   PHYSICAL EXAM:   Physical Exam  Constitutional:       Appearance: Normal appearance. She is well-developed.   HENT:      Head: Normocephalic.      Right Ear: Tympanic membrane normal.      Left Ear: Tympanic membrane normal.      Nose: Nose normal.      Mouth/Throat:      Mouth: Mucous membranes are moist.      Pharynx: No oropharyngeal exudate or posterior oropharyngeal erythema.   Eyes:      General:         Right eye: No discharge.         Left eye: No discharge.      Pupils: Pupils are equal, round, and reactive to light.   Cardiovascular:      Rate and Rhythm: Normal rate and regular rhythm.      Heart sounds: Normal heart sounds. No murmur heard.     No  friction rub. No gallop.   Pulmonary:      Effort: Pulmonary effort is normal. No respiratory distress.      Breath sounds: Wheezing present. No rhonchi or rales.   Abdominal:      General: Bowel sounds are normal. There is no distension.      Palpations: Abdomen is soft. There is no mass.      Tenderness: There is no abdominal tenderness. There is no right CVA tenderness, left CVA tenderness or guarding.   Musculoskeletal:         General: No tenderness.      Cervical back: Normal range of motion and neck supple. No tenderness.      Right lower leg: No edema.      Left lower leg: No edema.   Lymphadenopathy:      Cervical: No cervical adenopathy.   Skin:     General: Skin is warm and dry.      Findings: No rash.   Neurological:      Mental Status: She is alert and oriented to person, place, and time.      Coordination: Coordination normal.      Gait: Gait normal.   Psychiatric:         Mood and Affect: Mood normal.         Behavior: Behavior normal.         Thought Content: Thought content normal.         Judgment: Judgment normal.       /76 (BP Location: Right arm, Patient Position: Sitting, Cuff Size: large)   Pulse 80   Temp 99.2 °F (37.3 °C) (Oral)   Ht 5' 4\" (1.626 m)   Wt 233 lb (105.7 kg)   BMI 39.99 kg/m²   Wt Readings from Last 2 Encounters:   02/15/24 233 lb (105.7 kg)   11/20/23 239 lb (108.4 kg)     Body mass index is 39.99 kg/m².(2)  Lab Results   Component Value Date    WBC 10.1 01/09/2023    RBC 4.35 01/09/2023    HGB 11.7 (L) 01/09/2023    HCT 38.0 01/09/2023    MCV 87.4 01/09/2023    MCH 26.9 01/09/2023    MCHC 30.8 (L) 01/09/2023    RDW 13.4 01/09/2023    .0 01/09/2023    MPV 8.6 01/09/2019      Lab Results   Component Value Date    GLU 95 01/09/2023    BUN 11 01/09/2023    BUNCREA 7.9 (L) 01/09/2023    CREATSERUM 1.39 (H) 01/09/2023    ANIONGAP 4 01/09/2023    GFRNAA 66 11/24/2021    GFRAA 76 11/24/2021    CA 10.3 (H) 01/09/2023    OSMOCALC 289 01/09/2023    ALKPHO 125 (H)  2023    AST 15 2023    ALT 16 2023    ALKPHOS 120 (H) 2016    BILT 0.4 2023    TP 9.1 (H) 2023    ALB 3.4 2023    GLOBULIN 5.7 (H) 2023    AGRATIO 0.8 (L) 2015     2023    K 4.5 2023     2023    CO2 30.0 2023      Lab Results   Component Value Date     2023    A1C 5.7 (H) 2023      Lab Results   Component Value Date    CHOLEST 203 (H) 2023    TRIG 60 2023    HDL 41 2023     (H) 2023    VLDL 11 2023    NONHDLC 162 (H) 2023    CALCNONHDL 135 (H) 2015      Lab Results   Component Value Date    TSH 3.680 2023                ASSESSMENT/PLAN:     Problem List Items Addressed This Visit       Fever - Primary     Fever- Most likely viral syndrome with worsening symptoms and nausea.    Plan  Patient with flu symptoms.    Plan  Hydrate with fluids  Tylenol two tabs every six hours.. Not to exceed 4 grams in one day.  Drink Hot tea with lemon  Drink Hot tea with honey  Gargle with warm salt water if you have a sore throat.  Benzonatate 100 mg  does not work for her.  Prednisone 40 mg x 4 days.  Doxycycline 100 mg po BID.                 No orders of the defined types were placed in this encounter.      Meds This Visit:  Requested Prescriptions     Signed Prescriptions Disp Refills    Doxycycline Monohydrate 100 MG Oral Cap 14 capsule 0     Sig: Take 1 capsule (100 mg total) by mouth 2 (two) times daily for 7 days.    predniSONE 20 MG Oral Tab 8 tablet 0     Si tabs daily for 4 days.       Imaging & Referrals:  None         XIAO Mendiola

## 2024-03-01 ENCOUNTER — TELEPHONE (OUTPATIENT)
Dept: INTERNAL MEDICINE CLINIC | Facility: CLINIC | Age: 54
End: 2024-03-01

## 2024-03-01 NOTE — TELEPHONE ENCOUNTER
----- Message from Christine Francis sent at 2/29/2024 12:51 PM CST -----  Regarding: Follow-up to my visit on 2.15.24  Contact: [phone]  Hi,    I forgot to follow up last week.    It took almost a week for some relief.  I still had symptoms on February 21st but felt better on the 22nd.  I was concerned that the antibiotics/prednisone was not working.  I still have a minor cough, but the nausea has subsided.  Thank you!

## 2024-03-01 NOTE — TELEPHONE ENCOUNTER
FYI- Please see patient E-mail.     Patient stated that finished medications. Cough is very minimal. Feeling a lot better.

## 2024-07-09 ENCOUNTER — TELEMEDICINE (OUTPATIENT)
Dept: INTERNAL MEDICINE CLINIC | Facility: CLINIC | Age: 54
End: 2024-07-09
Payer: COMMERCIAL

## 2024-07-09 DIAGNOSIS — I10 ESSENTIAL HYPERTENSION: Primary | ICD-10-CM

## 2024-07-09 PROCEDURE — 99213 OFFICE O/P EST LOW 20 MIN: CPT | Performed by: NURSE PRACTITIONER

## 2024-07-09 RX ORDER — AMLODIPINE BESYLATE 5 MG/1
5 TABLET ORAL DAILY
Qty: 30 TABLET | Refills: 5 | Status: SHIPPED | OUTPATIENT
Start: 2024-07-09

## 2024-07-09 NOTE — PROGRESS NOTES
HPI:    Patient ID: Christine Francis is a 53 year old female.  Telehealth outside of [facility]  Telehealth Verbal Consent   I conducted a telehealth visit with Christine Francis today, 07/09/24, which was completed using two-way, real-time interactive audio and video communication. This has been done in good munir to provide continuity of care in the best interest of the provider-patient relationship, due to the COVID -19 public health crisis/national emergency where restrictions of face-to-face office visits are ongoing. Every conscious effort was taken to allow for sufficient and adequate time to complete the visit.  The patient was made aware of the limitations of the telehealth visit, including treatment limitations as no physical exam could be performed.  The patient was advised to call 911 or to go to the ER in case there was an emergency.  The patient was also advised of the potential privacy & security concerns related to the telehealth platform.   The patient was made aware of where to find [facility]'s notice of privacy practices, telehealth consent form and other related consent forms and documents.  which are located on the [facility] website. The patient verbally agreed to telehealth consent form, related consents and the risks discussed.    Lastly, the patient confirmed that they were in [place].   Included in this visit, time may have been spent reviewing labs, medications, radiology tests and decision making. Appropriate medical decision-making and tests are ordered as detailed in the plan of care above.  Coding/billing information is submitted for this visit based on complexity of care and/or time spent for the visit.  10 minutes      HPI Follow up on HTN  Weight 220      HTN  Patient states she had a virus and lost some of her taste.  Since then she has been eating healthy and her weight is down to 220 pounds.  Her blood pressure she monitors twice a day and has been running low.  She  has had periods of dizziness.  She has tried holding her amlodipine and feels better.     Her blood pressure has been running low since she is lost weight.  She is experienced some dizziness.  She is trying holding her amlodipine and feels better        Immunization History   Administered Date(s) Administered    Covid-19 Vaccine Moderna 100 mcg/0.5 ml 2021, 2021, 2021, 2022    Covid-19 Vaccine Moderna 50 Mcg/0.25 Ml 2022    Influenza 2017, 2018    TDAP 2020       Past Medical History:    Essential hypertension    Fibroids    surgery      Past Surgical History:   Procedure Laterality Date          Colonoscopy N/A 2021    Procedure: COLONOSCOPY;  Surgeon: Kelechi Sanabria MD;  Location: [facility] ENDOSCOPY    Other surgical history  2015    Root Canal Extraction      Social History     Socioeconomic History    Marital status: Legally    Tobacco Use    Smoking status: Never    Smokeless tobacco: Never   Vaping Use    Vaping status: Never Used   Substance and Sexual Activity    Alcohol use: Yes     Comment: very rare     Drug use: No    Sexual activity: Never     Partners: Male   Other Topics Concern    Caffeine Concern Yes     Comment: coffee, 2 cups/day          Review of Systems   Constitutional:  Negative for chills, fatigue and fever.   HENT:  Negative for congestion, ear discharge, ear pain, facial swelling, hearing loss, postnasal drip, sinus pressure, sore throat and trouble swallowing.    Eyes:  Negative for pain, discharge, redness and visual disturbance.   Respiratory:  Negative for cough, chest tightness, shortness of breath and wheezing.    Cardiovascular:  Negative for chest pain, palpitations and leg swelling.   Gastrointestinal:  Negative for abdominal distention, abdominal pain, constipation, diarrhea, nausea and vomiting.   Endocrine: Negative for cold intolerance, heat intolerance, polydipsia, polyphagia and polyuria.    Genitourinary:  Negative for difficulty urinating, dysuria, pelvic pain and vaginal bleeding.   Musculoskeletal:  Negative for back pain, gait problem, neck pain and neck stiffness.   Skin:  Negative for color change and rash.   Neurological:  Positive for dizziness. Negative for seizures, weakness and headaches.   Psychiatric/Behavioral:  Negative for agitation and sleep disturbance. The patient is not nervous/anxious.               Current Outpatient Medications   Medication Sig Dispense Refill    amLODIPine 5 MG Oral Tab Take 1 tablet (5 mg total) by mouth daily. 30 tablet 5    predniSONE 20 MG Oral Tab 2 tabs daily for 4 days. 8 tablet 0    benzonatate 200 MG Oral Cap Take 1 capsule (200 mg total) by mouth 3 (three) times daily as needed for cough. (Patient not taking: Reported on 2/15/2024) 30 capsule 1    clotrimazole-betamethasone 1-0.05 % External Cream Apply 1 Application  topically 2 (two) times daily as needed. 15 g 3    Vitamin D3 50 MCG (2000 UT) Oral Cap Take 1 capsule (2,000 Units total) by mouth daily. (Patient not taking: Reported on 2/15/2024)       Allergies:No Known Allergies   PHYSICAL EXAM:   Physical Exam  Constitutional:       Appearance: Normal appearance.   HENT:      Head: Normocephalic.      Mouth/Throat:      Mouth: Mucous membranes are moist.   Pulmonary:      Effort: Pulmonary effort is normal.   Musculoskeletal:         General: Normal range of motion.   Skin:     General: Skin is dry.   Neurological:      Mental Status: She is alert and oriented to person, place, and time.   Psychiatric:         Mood and Affect: Mood normal.         Behavior: Behavior normal.         Thought Content: Thought content normal.         Judgment: Judgment normal.       There were no vitals taken for this visit.  Wt Readings from Last 2 Encounters:   02/15/24 233 lb (105.7 kg)   11/20/23 239 lb (108.4 kg)     There is no height or weight on file to calculate BMI.(2)  Lab Results   Component Value Date     WBC 10.1 01/09/2023    RBC 4.35 01/09/2023    HGB 11.7 (L) 01/09/2023    HCT 38.0 01/09/2023    MCV 87.4 01/09/2023    MCH 26.9 01/09/2023    MCHC 30.8 (L) 01/09/2023    RDW 13.4 01/09/2023    .0 01/09/2023    MPV 8.6 01/09/2019      Lab Results   Component Value Date    GLU 95 01/09/2023    BUN 11 01/09/2023    BUNCREA 7.9 (L) 01/09/2023    CREATSERUM 1.39 (H) 01/09/2023    ANIONGAP 4 01/09/2023    GFRNAA 66 11/24/2021    GFRAA 76 11/24/2021    CA 10.3 (H) 01/09/2023    OSMOCALC 289 01/09/2023    ALKPHO 125 (H) 01/09/2023    AST 15 01/09/2023    ALT 16 01/09/2023    ALKPHOS 120 (H) 02/29/2016    BILT 0.4 01/09/2023    TP 9.1 (H) 01/09/2023    ALB 3.4 01/09/2023    GLOBULIN 5.7 (H) 01/09/2023    AGRATIO 0.8 (L) 09/21/2015     01/09/2023    K 4.5 01/09/2023     01/09/2023    CO2 30.0 01/09/2023      Lab Results   Component Value Date     01/09/2023    A1C 5.7 (H) 01/09/2023      Lab Results   Component Value Date    CHOLEST 203 (H) 01/09/2023    TRIG 60 01/09/2023    HDL 41 01/09/2023     (H) 01/09/2023    VLDL 11 01/09/2023    NONHDLC 162 (H) 01/09/2023    CALCNONHDL 135 (H) 09/21/2015      Lab Results   Component Value Date    TSH 3.680 01/09/2023                ASSESSMENT/PLAN:     Problem List Items Addressed This Visit       Essential hypertension - Primary     Patient states she had a virus and lost some of her taste.  Since then she has been eating healthy and her weight is down to 220 pounds.  Her blood pressure she monitors twice a day and has been running low.  She has had periods of dizziness.  She has tried holding her amlodipine and feels better.    Plan  Blood pressure goal systolic below 130 and diastolic below 90  Decrease amlodipine to 5 mg daily  Continue weight loss  Follow low-sodium diet  Follow-up within 2 months with blood pressure readings         Relevant Medications    amLODIPine 5 MG Oral Tab          No orders of the defined types were placed in this  encounter.      Meds This Visit:  Requested Prescriptions     Signed Prescriptions Disp Refills    amLODIPine 5 MG Oral Tab 30 tablet 5     Sig: Take 1 tablet (5 mg total) by mouth daily.       Imaging & Referrals:  None         XIAO Mendiola

## 2024-07-09 NOTE — ASSESSMENT & PLAN NOTE
Patient states she had a virus and lost some of her taste.  Since then she has been eating healthy and her weight is down to 220 pounds.  Her blood pressure she monitors twice a day and has been running low.  She has had periods of dizziness.  She has tried holding her amlodipine and feels better.    Plan  Blood pressure goal systolic below 130 and diastolic below 90  Decrease amlodipine to 5 mg daily  Continue weight loss  Follow low-sodium diet  Follow-up within 2 months with blood pressure readings

## 2024-10-09 RX ORDER — CLOTRIMAZOLE AND BETAMETHASONE DIPROPIONATE 10; .64 MG/G; MG/G
1 CREAM TOPICAL 2 TIMES DAILY PRN
Qty: 45 G | Refills: 3 | Status: SHIPPED | OUTPATIENT
Start: 2024-10-09

## 2024-10-09 NOTE — TELEPHONE ENCOUNTER
Please Review. Protocol Failed; No Protocol     Requested Prescriptions   Pending Prescriptions Disp Refills    clotrimazole-betamethasone 1-0.05 % External Cream 15 g 3     Sig: Apply 1 Application  topically 2 (two) times daily as needed.       There is no refill protocol information for this order              Recent Outpatient Visits              3 months ago Essential hypertension    [facility], Bess Lara APRN    Telemedicine    7 months ago [facility]    [facility]    9 months ago Acute cough    [facility] [place] [place] [facility]    [facility]    10 months ago [facility]    [facility]    1 year ago [facility]    [facility]

## 2024-10-11 ENCOUNTER — NURSE TRIAGE (OUTPATIENT)
Dept: INTERNAL MEDICINE CLINIC | Facility: CLINIC | Age: 54
End: 2024-10-11

## 2024-10-11 NOTE — TELEPHONE ENCOUNTER
Patient scheduled a mychart appointment noting the following    Coughing, short of breath, wheezing for several weeks.     Left message to call back and mychart message sent

## 2024-10-11 NOTE — TELEPHONE ENCOUNTER
Please reply to pool: EM RN TRIAGE  Action Requested: Summary for Provider     []  Critical Lab, Recommendations Needed  [] Need Additional Advice  [x]   FYI    []   Need Orders  [] Need Medications Sent to Pharmacy  []  Other     SUMMARY: Patient contacts clinic regarding cough.  Present for several weeks.  Home covid test negative.  Reports ongoing cuough and wheezing.  Wheezing occurs when talking or laughing or coughing.  Denies fever.  Speech is calm and clear.  Nurse recommended appointment today.  She is unable to come until she is off work.  Nurse advised immediate care visit today, [place] [facility] [place]    Reason for call: Acute  Onset: Data Unavailable                       Reason for Disposition   Wheezing is present    Protocols used: Cough-A-OH

## 2024-10-20 ENCOUNTER — TELEPHONE (OUTPATIENT)
Dept: INTERNAL MEDICINE CLINIC | Facility: CLINIC | Age: 54
End: 2024-10-20

## 2024-12-10 ENCOUNTER — PATIENT MESSAGE (OUTPATIENT)
Dept: INTERNAL MEDICINE CLINIC | Facility: CLINIC | Age: 54
End: 2024-12-10

## 2024-12-10 DIAGNOSIS — Z12.31 SCREENING MAMMOGRAM FOR BREAST CANCER: Primary | ICD-10-CM

## 2024-12-24 ENCOUNTER — LAB ENCOUNTER (OUTPATIENT)
Dept: LAB | Age: 54
End: 2024-12-24
Attending: NURSE PRACTITIONER
Payer: COMMERCIAL

## 2024-12-24 ENCOUNTER — OFFICE VISIT (OUTPATIENT)
Dept: INTERNAL MEDICINE CLINIC | Facility: CLINIC | Age: 54
End: 2024-12-24

## 2024-12-24 VITALS
HEIGHT: 64 IN | DIASTOLIC BLOOD PRESSURE: 79 MMHG | BODY MASS INDEX: 38.01 KG/M2 | SYSTOLIC BLOOD PRESSURE: 127 MMHG | WEIGHT: 222.63 LBS | HEART RATE: 79 BPM

## 2024-12-24 DIAGNOSIS — T78.40XA ALLERGY, INITIAL ENCOUNTER: ICD-10-CM

## 2024-12-24 DIAGNOSIS — Z12.83 SCREENING FOR SKIN CANCER: ICD-10-CM

## 2024-12-24 DIAGNOSIS — E66.01 CLASS 3 SEVERE OBESITY DUE TO EXCESS CALORIES WITH SERIOUS COMORBIDITY AND BODY MASS INDEX (BMI) OF 40.0 TO 44.9 IN ADULT (HCC): ICD-10-CM

## 2024-12-24 DIAGNOSIS — I10 ESSENTIAL HYPERTENSION: ICD-10-CM

## 2024-12-24 DIAGNOSIS — Z00.00 ANNUAL PHYSICAL EXAM: ICD-10-CM

## 2024-12-24 DIAGNOSIS — E66.813 CLASS 3 SEVERE OBESITY DUE TO EXCESS CALORIES WITH SERIOUS COMORBIDITY AND BODY MASS INDEX (BMI) OF 40.0 TO 44.9 IN ADULT (HCC): ICD-10-CM

## 2024-12-24 DIAGNOSIS — Z12.11 SCREENING FOR COLON CANCER: Primary | ICD-10-CM

## 2024-12-24 DIAGNOSIS — K21.9 GASTROESOPHAGEAL REFLUX DISEASE WITHOUT ESOPHAGITIS: ICD-10-CM

## 2024-12-24 DIAGNOSIS — N89.8 VAGINAL SORE: ICD-10-CM

## 2024-12-24 DIAGNOSIS — Z12.31 ENCOUNTER FOR SCREENING MAMMOGRAM FOR MALIGNANT NEOPLASM OF BREAST: ICD-10-CM

## 2024-12-24 LAB
ALBUMIN SERPL-MCNC: 4.2 G/DL (ref 3.2–4.8)
ALBUMIN/GLOB SERPL: 1.1 {RATIO} (ref 1–2)
ALP LIVER SERPL-CCNC: 93 U/L
ALT SERPL-CCNC: 13 U/L
ANION GAP SERPL CALC-SCNC: 7 MMOL/L (ref 0–18)
AST SERPL-CCNC: 17 U/L (ref ?–34)
BILIRUB SERPL-MCNC: 0.4 MG/DL (ref 0.3–1.2)
BILIRUB UR QL: NEGATIVE
BUN BLD-MCNC: 18 MG/DL (ref 9–23)
BUN/CREAT SERPL: 10.4 (ref 10–20)
CALCIUM BLD-MCNC: 10.4 MG/DL (ref 8.7–10.4)
CHLORIDE SERPL-SCNC: 106 MMOL/L (ref 98–112)
CHOLEST SERPL-MCNC: 186 MG/DL (ref ?–200)
CLARITY UR: CLEAR
CO2 SERPL-SCNC: 28 MMOL/L (ref 21–32)
CREAT BLD-MCNC: 1.73 MG/DL
DEPRECATED RDW RBC AUTO: 42.3 FL (ref 35.1–46.3)
EGFRCR SERPLBLD CKD-EPI 2021: 34 ML/MIN/1.73M2 (ref 60–?)
ERYTHROCYTE [DISTWIDTH] IN BLOOD BY AUTOMATED COUNT: 13.1 % (ref 11–15)
FASTING PATIENT LIPID ANSWER: YES
FASTING STATUS PATIENT QL REPORTED: YES
GLOBULIN PLAS-MCNC: 3.8 G/DL (ref 2–3.5)
GLUCOSE BLD-MCNC: 77 MG/DL (ref 70–99)
GLUCOSE UR-MCNC: NORMAL MG/DL
HCT VFR BLD AUTO: 36.2 %
HDLC SERPL-MCNC: 38 MG/DL (ref 40–59)
HGB BLD-MCNC: 11.4 G/DL
KETONES UR-MCNC: NEGATIVE MG/DL
LDLC SERPL CALC-MCNC: 136 MG/DL (ref ?–100)
LEUKOCYTE ESTERASE UR QL STRIP.AUTO: 500
MCH RBC QN AUTO: 27.6 PG (ref 26–34)
MCHC RBC AUTO-ENTMCNC: 31.5 G/DL (ref 31–37)
MCV RBC AUTO: 87.7 FL
NITRITE UR QL STRIP.AUTO: NEGATIVE
NONHDLC SERPL-MCNC: 148 MG/DL (ref ?–130)
OSMOLALITY SERPL CALC.SUM OF ELEC: 293 MOSM/KG (ref 275–295)
PH UR: 6.5 [PH] (ref 5–8)
PLATELET # BLD AUTO: 360 10(3)UL (ref 150–450)
POTASSIUM SERPL-SCNC: 4.3 MMOL/L (ref 3.5–5.1)
PROT SERPL-MCNC: 8 G/DL (ref 5.7–8.2)
PROT UR-MCNC: 70 MG/DL
RBC # BLD AUTO: 4.13 X10(6)UL
SODIUM SERPL-SCNC: 141 MMOL/L (ref 136–145)
SP GR UR STRIP: 1.01 (ref 1–1.03)
TRIGL SERPL-MCNC: 65 MG/DL (ref 30–149)
TSI SER-ACNC: 3.23 UIU/ML (ref 0.55–4.78)
UROBILINOGEN UR STRIP-ACNC: NORMAL
VIT B12 SERPL-MCNC: 573 PG/ML (ref 211–911)
VIT D+METAB SERPL-MCNC: 56.3 NG/ML (ref 30–100)
VLDLC SERPL CALC-MCNC: 12 MG/DL (ref 0–30)
WBC # BLD AUTO: 9 X10(3) UL (ref 4–11)
WBC #/AREA URNS AUTO: >50 /HPF
WBC CLUMPS UR QL AUTO: PRESENT /HPF

## 2024-12-24 PROCEDURE — 82306 VITAMIN D 25 HYDROXY: CPT

## 2024-12-24 PROCEDURE — 86003 ALLG SPEC IGE CRUDE XTRC EA: CPT

## 2024-12-24 PROCEDURE — 85027 COMPLETE CBC AUTOMATED: CPT

## 2024-12-24 PROCEDURE — 80053 COMPREHEN METABOLIC PANEL: CPT

## 2024-12-24 PROCEDURE — 84165 PROTEIN E-PHORESIS SERUM: CPT

## 2024-12-24 PROCEDURE — 36415 COLL VENOUS BLD VENIPUNCTURE: CPT

## 2024-12-24 PROCEDURE — 84443 ASSAY THYROID STIM HORMONE: CPT

## 2024-12-24 PROCEDURE — 82607 VITAMIN B-12: CPT

## 2024-12-24 PROCEDURE — 81001 URINALYSIS AUTO W/SCOPE: CPT

## 2024-12-24 PROCEDURE — 82785 ASSAY OF IGE: CPT

## 2024-12-24 PROCEDURE — 80061 LIPID PANEL: CPT

## 2024-12-24 RX ORDER — AMLODIPINE BESYLATE 10 MG/1
10 TABLET ORAL DAILY
Qty: 90 TABLET | Refills: 0 | Status: SHIPPED | OUTPATIENT
Start: 2024-12-24

## 2024-12-24 RX ORDER — MUPIROCIN 20 MG/G
1 OINTMENT TOPICAL 3 TIMES DAILY
Qty: 15 G | Refills: 1 | Status: SHIPPED | OUTPATIENT
Start: 2024-12-24

## 2024-12-24 RX ORDER — CIPROFLOXACIN 250 MG/1
250 TABLET, FILM COATED ORAL 2 TIMES DAILY
Qty: 10 TABLET | Refills: 0 | Status: SHIPPED | OUTPATIENT
Start: 2024-12-24 | End: 2024-12-26 | Stop reason: CLARIF

## 2024-12-24 NOTE — PATIENT INSTRUCTIONS
Allergy Treatment Plan  -Take otc allergy medications as directed (over the counter, generic Claritin, Zyrtec, Xyzal or Allegra)     -use of steroidal nasal spray as advised (Flonase, Rhinocort)-this is now available as a generic, over the counter spray if your insurance doesn't cover it      used every morning faithfully each morning during the allergy season is the BEST treatment; they are very safe for use over a period of 6-7 months (April - October)     -over the counter allergy eye drops-Zaditor (ketotifen) 1 drop twice a day works very well for eye symptoms     -keep windows closed at night     -do not allow pets to sleep in room     -use allergen covers on pillows, mattress and box spring to reduce exposure to dust mites     -use of Simply Saline Mist     -shower after outdoor activities, especially when allergy counts are high     -Use of allergen filters for furnace; consider air purifier if allergies are significant at night     -Call  If symptoms do not improve, worsen or with other questions or concerns     Instructed to take omeprazole over the counter for her GERD symptoms.

## 2024-12-24 NOTE — ASSESSMENT & PLAN NOTE
Normal exam.  Labs as ordered.  Skin check normal.  No significant abnormal nevi.  Breast exam completed-no palpable abnormalities, discharge from the nipples or axillary adenopathy.  No cervical or inguinal lymphadenopathy.  Hernial orifices intact.  Pelvic exam completed-no cervical movement tenderness, adnexal palpable abnormalities.  No cystocele, rectocele or uterine descent.  Pap DUE  in 2025  Colonoscopy- Due in 2024 or other note states 2031. She will follow up with Dr. Dean    Mammogram ordered

## 2024-12-24 NOTE — ASSESSMENT & PLAN NOTE
Working hard with healthy eating and increased walking    Wt Readings from Last 6 Encounters:   12/24/24 222 lb 9.6 oz (101 kg)   02/15/24 233 lb (105.7 kg)   11/20/23 239 lb (108.4 kg)   09/11/23 247 lb (112 kg)   04/17/23 251 lb (113.9 kg)   04/14/23 245 lb (111.1 kg)

## 2024-12-24 NOTE — ASSESSMENT & PLAN NOTE
Patient states that Amlodipine 10mg regulates her blood pressure better  She is not dizzy anymore    amLODIPine 10 MG Oral Tab          Take 1 tablet (10 mg total) by mouth daily., Normal, Disp-90 tablet, R-0

## 2024-12-25 ENCOUNTER — TELEPHONE (OUTPATIENT)
Dept: INTERNAL MEDICINE CLINIC | Facility: CLINIC | Age: 54
End: 2024-12-25

## 2024-12-25 ENCOUNTER — PATIENT MESSAGE (OUTPATIENT)
Dept: INTERNAL MEDICINE CLINIC | Facility: CLINIC | Age: 54
End: 2024-12-25

## 2024-12-25 DIAGNOSIS — D72.829 LEUKOCYTOSIS, UNSPECIFIED TYPE: Primary | ICD-10-CM

## 2024-12-25 NOTE — TELEPHONE ENCOUNTER
Patient called    No answer    Message left on her phone to hydrate with water    Submit a urine culture    Start antibiotic after she submits urine culture.    Keep in touch on my chart    Bess Farah DNP  Working with

## 2024-12-26 ENCOUNTER — TELEPHONE (OUTPATIENT)
Dept: INTERNAL MEDICINE CLINIC | Facility: CLINIC | Age: 54
End: 2024-12-26

## 2024-12-26 ENCOUNTER — LAB ENCOUNTER (OUTPATIENT)
Dept: LAB | Age: 54
End: 2024-12-26
Attending: NURSE PRACTITIONER
Payer: COMMERCIAL

## 2024-12-26 DIAGNOSIS — N18.32 STAGE 3B CHRONIC KIDNEY DISEASE (HCC): Primary | ICD-10-CM

## 2024-12-26 DIAGNOSIS — D72.829 LEUKOCYTOSIS, UNSPECIFIED TYPE: ICD-10-CM

## 2024-12-26 LAB
ALBUMIN SERPL ELPH-MCNC: 3.29 G/DL (ref 3.75–5.21)
ALBUMIN/GLOB SERPL: 0.8 {RATIO} (ref 1–2)
ALPHA1 GLOB SERPL ELPH-MCNC: 0.3 G/DL (ref 0.19–0.46)
ALPHA2 GLOB SERPL ELPH-MCNC: 1.07 G/DL (ref 0.48–1.05)
B-GLOBULIN SERPL ELPH-MCNC: 2.04 G/DL (ref 0.68–1.23)
GAMMA GLOB SERPL ELPH-MCNC: 0.7 G/DL (ref 0.62–1.7)
PROT SERPL-MCNC: 7.4 G/DL (ref 5.7–8.2)

## 2024-12-26 PROCEDURE — 87086 URINE CULTURE/COLONY COUNT: CPT

## 2024-12-26 PROCEDURE — 87186 SC STD MICRODIL/AGAR DIL: CPT

## 2024-12-26 PROCEDURE — 87088 URINE BACTERIA CULTURE: CPT

## 2024-12-26 RX ORDER — CIPROFLOXACIN 250 MG/1
250 TABLET, FILM COATED ORAL 2 TIMES DAILY
Qty: 10 TABLET | Refills: 0 | Status: SHIPPED | OUTPATIENT
Start: 2024-12-26 | End: 2024-12-31

## 2024-12-26 RX ORDER — CIPROFLOXACIN 250 MG/1
250 TABLET, FILM COATED ORAL 2 TIMES DAILY
Qty: 10 TABLET | Refills: 0 | Status: SHIPPED | OUTPATIENT
Start: 2024-12-26 | End: 2024-12-26

## 2024-12-26 RX ORDER — CIPROFLOXACIN 250 MG/1
250 TABLET, FILM COATED ORAL 2 TIMES DAILY
Qty: 10 TABLET | Refills: 0 | Status: CANCELLED | OUTPATIENT
Start: 2024-12-26 | End: 2024-12-31

## 2024-12-26 NOTE — TELEPHONE ENCOUNTER
Follow up     She is on her way to submit urine culture    She can start Ciprofloxacin after she submits specimen    Discussed decreased Kidney function.    Advised to hydrate with water and Gatorade    Repeat CMP in February    Bess Farah DNP  Working with

## 2024-12-27 NOTE — TELEPHONE ENCOUNTER
Outpatient Medication Detail     Disp Refills Start End    ciprofloxacin 250 MG Oral Tab 10 tablet 0 12/26/2024 12/31/2024    Sig - Route: Take 1 tablet (250 mg total) by mouth 2 (two) times daily for 5 days. - Oral    Sent to pharmacy as: Ciprofloxacin HCl 250 MG Oral Tablet (Cipro)    E-Prescribing Status: Receipt confirmed by pharmacy (12/26/2024  3:57 PM CST)      Pharmacy    [id number] [address], [phone], [phone]

## 2024-12-30 ENCOUNTER — TELEPHONE (OUTPATIENT)
Dept: INTERNAL MEDICINE CLINIC | Facility: CLINIC | Age: 54
End: 2024-12-30

## 2024-12-30 LAB
ALLERGEN BRAZIL NUT: <0.1 KUA/L (ref ?–0.1)
ALMOND IGE QN: <0.1 KUA/L (ref ?–0.1)
CASHEW NUT IGE QN: <0.1 KUA/L (ref ?–0.1)
CLAM IGE QN: <0.1 KUA/L (ref ?–0.1)
CODFISH IGE QN: <0.1 KUA/L (ref ?–0.1)
CORN IGE QN: <0.1 KUA/L (ref ?–0.1)
COW MILK IGE QN: <0.1 KUA/L (ref ?–0.1)
EGG WHITE IGE QN: <0.1 KUA/L (ref ?–0.1)
GLUTEN IGE QN: <0.1 KUA/L (ref ?–0.1)
HAZELNUT IGE QN: <0.1 KUA/L (ref ?–0.1)
IGE SERPL-ACNC: 23.7 KU/L (ref 2–214)
PEANUT IGE QN: <0.1 KUA/L (ref ?–0.1)
SALMON IGE QN: <0.1 KUA/L (ref ?–0.1)
SCALLOP IGE QN: <0.1 KUA/L (ref ?–0.1)
SESAME SEED IGE QN: <0.1 KUA/L (ref ?–0.1)
SHRIMP IGE QN: <0.1 KUA/L (ref ?–0.1)
SOYBEAN IGE QN: <0.1 KUA/L (ref ?–0.1)
WALNUT IGE QN: <0.1 KUA/L (ref ?–0.1)
WHEAT IGE QN: <0.1 KUA/L (ref ?–0.1)

## 2024-12-31 ENCOUNTER — TELEPHONE (OUTPATIENT)
Dept: INTERNAL MEDICINE CLINIC | Facility: CLINIC | Age: 54
End: 2024-12-31

## 2024-12-31 NOTE — TELEPHONE ENCOUNTER
Please call patient and have her follow up with me to review labs.     Have her follow up with a in person visit.     Bess Farah DNP  Working with

## 2025-01-02 NOTE — TELEPHONE ENCOUNTER
Pt stated she was advised to come in 3 months when she spoke to you please clarify how soon Pt need to be seen

## 2025-01-09 ENCOUNTER — OFFICE VISIT (OUTPATIENT)
Dept: INTERNAL MEDICINE CLINIC | Facility: CLINIC | Age: 55
End: 2025-01-09
Payer: COMMERCIAL

## 2025-01-09 ENCOUNTER — TELEPHONE (OUTPATIENT)
Age: 55
End: 2025-01-09

## 2025-01-09 VITALS
HEIGHT: 64 IN | HEART RATE: 68 BPM | BODY MASS INDEX: 37.25 KG/M2 | WEIGHT: 218.19 LBS | DIASTOLIC BLOOD PRESSURE: 70 MMHG | OXYGEN SATURATION: 99 % | SYSTOLIC BLOOD PRESSURE: 118 MMHG

## 2025-01-09 DIAGNOSIS — N18.32 STAGE 3B CHRONIC KIDNEY DISEASE (HCC): ICD-10-CM

## 2025-01-09 DIAGNOSIS — R77.9 ABNORMAL PROTEIN ELECTROPHORESIS: Primary | ICD-10-CM

## 2025-01-09 PROCEDURE — 3078F DIAST BP <80 MM HG: CPT | Performed by: NURSE PRACTITIONER

## 2025-01-09 PROCEDURE — 99214 OFFICE O/P EST MOD 30 MIN: CPT | Performed by: NURSE PRACTITIONER

## 2025-01-09 PROCEDURE — 3008F BODY MASS INDEX DOCD: CPT | Performed by: NURSE PRACTITIONER

## 2025-01-09 PROCEDURE — 3074F SYST BP LT 130 MM HG: CPT | Performed by: NURSE PRACTITIONER

## 2025-01-09 NOTE — PROGRESS NOTES
HPI:    Patient ID: Christine Francis is a 54 year old female.    HPI Follow up  54-year-old female I recently seen.  She did not have any UTI symptoms.  She did have a positive urine culture with E. coli.  I treated her with ciprofloxacin and she states she feels much better.    CKD  I reviewed her lab work today and it is noted that her kidney function has decreased.  I referred her to nephrology for decreasing GFR    Band in beta region      Component  Ref Range & Units 24  8:38 AM   Total Protein  5.7 - 8.2 g/dL 7.4   Albumin  3.75 - 5.21 g/dL 3.29 Low    Alpha-1-Globulins  0.19 - 0.46 g/dL 0.30   Alpha-2-Globulins  0.48 - 1.05 g/dL 1.07 High    Beta Globulins  0.68 - 1.23 g/dL 2.04 High    Gamma Globulins  0.62 - 1.70 g/dL 0.70   Albumin/Globulin Ratio  1.00 - 2.00 0.80 Low    SPE Interpretation Serum protein electrophoresis demonstrates the presence of a probable band in the beta region.          Immunization History   Administered Date(s) Administered    Covid-19 Vaccine Moderna 100 mcg/0.5 ml 2021, 2021, 2021, 2022    Covid-19 Vaccine Moderna 50 Mcg/0.25 Ml 2022    Influenza 2017, 2018    Influenza Vaccine, trivalent (IIV3), PF 0.5mL (79874) 2024    TDAP 2020       Past Medical History:    Essential hypertension    Fibroids    surgery      Past Surgical History:   Procedure Laterality Date          Colonoscopy N/A 2021    Procedure: COLONOSCOPY;  Surgeon: Kelechi Sanabria MD;  [facility] ENDOSCOPY    Other surgical history  2015    Root Canal Extraction      Social History     Socioeconomic History    Marital status: Legally    Tobacco Use    Smoking status: Never    Smokeless tobacco: Never   Vaping Use    Vaping status: Never Used   Substance and Sexual Activity    Alcohol use: Yes     Comment: very rare     Drug use: No    Sexual activity: Never     Partners: Male   Other Topics Concern    Caffeine  Concern Yes     Comment: coffee, 2 cups/day          Review of Systems   Constitutional:  Negative for chills, fatigue and fever.   HENT:  Negative for congestion, ear discharge, ear pain, facial swelling, hearing loss, postnasal drip, sinus pressure, sore throat and trouble swallowing.    Eyes:  Negative for pain, discharge, redness and visual disturbance.   Respiratory:  Negative for cough, chest tightness, shortness of breath and wheezing.    Cardiovascular:  Negative for chest pain, palpitations and leg swelling.   Gastrointestinal:  Negative for abdominal distention, abdominal pain, constipation, diarrhea, nausea and vomiting.   Endocrine: Negative for cold intolerance, heat intolerance, polydipsia, polyphagia and polyuria.   Genitourinary:  Negative for difficulty urinating, dysuria, pelvic pain and vaginal bleeding.   Musculoskeletal:  Negative for back pain, gait problem, neck pain and neck stiffness.   Skin:  Negative for color change and rash.   Neurological:  Negative for dizziness, seizures, weakness and headaches.   Psychiatric/Behavioral:  Negative for agitation and sleep disturbance. The patient is not nervous/anxious.               Current Outpatient Medications   Medication Sig Dispense Refill    amLODIPine 10 MG Oral Tab Take 1 tablet (10 mg total) by mouth daily. 90 tablet 0    mupirocin 2 % External Ointment Apply 1 Application topically 3 (three) times daily. 15 g 1    omeprazole 20 MG Oral Capsule Delayed Release Take 1 capsule (20 mg total) by mouth every morning. 60 capsule 5    clotrimazole-betamethasone 1-0.05 % External Cream Apply 1 Application  topically 2 (two) times daily as needed. 45 g 3    Vitamin D3 50 MCG (2000 UT) Oral Cap Take 1 capsule (2,000 Units total) by mouth daily.      predniSONE 20 MG Oral Tab 2 tabs daily for 4 days. (Patient not taking: Reported on 1/9/2025) 8 tablet 0    benzonatate 200 MG Oral Cap Take 1 capsule (200 mg total) by mouth 3 (three) times daily as  needed for cough. (Patient not taking: Reported on 2/15/2024) 30 capsule 1     Allergies:Allergies[1]   PHYSICAL EXAM:   Physical Exam  Constitutional:       Appearance: Normal appearance. She is well-developed.   HENT:      Head: Normocephalic.      Right Ear: Tympanic membrane normal.      Left Ear: Tympanic membrane normal.      Nose: Nose normal.      Mouth/Throat:      Mouth: Mucous membranes are moist.      Pharynx: No oropharyngeal exudate or posterior oropharyngeal erythema.   Eyes:      General:         Right eye: No discharge.         Left eye: No discharge.      Pupils: Pupils are equal, round, and reactive to light.   Cardiovascular:      Rate and Rhythm: Normal rate and regular rhythm.      Heart sounds: Normal heart sounds. No murmur heard.     No friction rub. No gallop.   Pulmonary:      Effort: Pulmonary effort is normal. No respiratory distress.      Breath sounds: Normal breath sounds. No wheezing, rhonchi or rales.   Abdominal:      General: Bowel sounds are normal. There is no distension.      Palpations: Abdomen is soft. There is no mass.      Tenderness: There is no abdominal tenderness. There is no right CVA tenderness, left CVA tenderness or guarding.   Musculoskeletal:         General: No tenderness.      Cervical back: Normal range of motion and neck supple. No tenderness.      Right lower leg: No edema.      Left lower leg: No edema.   Lymphadenopathy:      Cervical: No cervical adenopathy.   Skin:     General: Skin is warm and dry.      Findings: No rash.   Neurological:      Mental Status: She is alert and oriented to person, place, and time.      Coordination: Coordination normal.      Gait: Gait normal.   Psychiatric:         Mood and Affect: Mood normal.         Behavior: Behavior normal.         Thought Content: Thought content normal.         Judgment: Judgment normal.       /70 (BP Location: Right arm, Patient Position: Sitting, Cuff Size: large)   Pulse 68   Ht 5' 4\"  (1.626 m)   Wt 218 lb 3.2 oz (99 kg)   SpO2 99%   BMI 37.45 kg/m²   Wt Readings from Last 2 Encounters:   01/09/25 218 lb 3.2 oz (99 kg)   12/24/24 222 lb 9.6 oz (101 kg)     Body mass index is 37.45 kg/m².(2)  Lab Results   Component Value Date    WBC 9.0 12/24/2024    RBC 4.13 12/24/2024    HGB 11.4 (L) 12/24/2024    HCT 36.2 12/24/2024    MCV 87.7 12/24/2024    MCH 27.6 12/24/2024    MCHC 31.5 12/24/2024    RDW 13.1 12/24/2024    .0 12/24/2024    MPV 8.6 01/09/2019      Lab Results   Component Value Date    GLU 77 12/24/2024    BUN 18 12/24/2024    BUNCREA 10.4 12/24/2024    CREATSERUM 1.73 (H) 12/24/2024    ANIONGAP 7 12/24/2024    GFRNAA 66 11/24/2021    GFRAA 76 11/24/2021    CA 10.4 12/24/2024    OSMOCALC 293 12/24/2024    ALKPHO 93 12/24/2024    AST 17 12/24/2024    ALT 13 12/24/2024    ALKPHOS 120 (H) 02/29/2016    BILT 0.4 12/24/2024    TP 8.0 12/24/2024    TP 7.4 12/24/2024    ALB 4.2 12/24/2024    ALB 3.29 (L) 12/24/2024    GLOBULIN 3.8 (H) 12/24/2024    AGRATIO 0.8 (L) 09/21/2015     12/24/2024    K 4.3 12/24/2024     12/24/2024    CO2 28.0 12/24/2024      Lab Results   Component Value Date     01/09/2023    A1C 5.7 (H) 01/09/2023      Lab Results   Component Value Date    CHOLEST 186 12/24/2024    TRIG 65 12/24/2024    HDL 38 (L) 12/24/2024     (H) 12/24/2024    VLDL 12 12/24/2024    NONHDLC 148 (H) 12/24/2024    CALCNONHDL 135 (H) 09/21/2015      Lab Results   Component Value Date    TSH 3.229 12/24/2024                ASSESSMENT/PLAN:     Problem List Items Addressed This Visit    None  Visit Diagnoses       Abnormal protein electrophoresis    -  Primary    Relevant Orders    Oncology/Hematology Referral - In Network    Stage 3b chronic kidney disease (HCC)        Relevant Orders    Nephrology Referral - In Network               No orders of the defined types were placed in this encounter.      Meds This Visit:  Requested Prescriptions      No prescriptions  requested or ordered in this encounter       Imaging & Referrals:  NEPHROLOGY - INTERNAL  OP REFERRAL TO HEMATOLOGY/ONCOLOGY GROUP         XIAO Mendiola          [1] No Known Allergies

## 2025-01-09 NOTE — TELEPHONE ENCOUNTER
NEW CONSULT    [id number]   Yoseph King  : 66  Ph: [phone]  Aprn: Gagan Kellogg  -  Dr. Wynn  Dx:Abnormal protein electrophoresis   Thanks Claudia

## 2025-01-14 ENCOUNTER — OFFICE VISIT (OUTPATIENT)
Age: 55
End: 2025-01-14
Attending: INTERNAL MEDICINE
Payer: COMMERCIAL

## 2025-01-14 VITALS
DIASTOLIC BLOOD PRESSURE: 73 MMHG | OXYGEN SATURATION: 99 % | SYSTOLIC BLOOD PRESSURE: 148 MMHG | RESPIRATION RATE: 18 BRPM | HEART RATE: 81 BPM | HEIGHT: 64.5 IN | TEMPERATURE: 97 F | BODY MASS INDEX: 36.73 KG/M2 | WEIGHT: 217.81 LBS

## 2025-01-14 DIAGNOSIS — R77.8 ABNORMAL SERUM PROTEIN ELECTROPHORESIS: Primary | ICD-10-CM

## 2025-01-14 DIAGNOSIS — D64.9 NORMOCHROMIC ANEMIA: ICD-10-CM

## 2025-01-14 LAB
B2 MICROGLOB SERPL-MCNC: 5.4 (ref 1–2.4)
BASOPHILS # BLD AUTO: 0.1 X10(3) UL (ref 0–0.2)
BASOPHILS NFR BLD AUTO: 1.3 %
DEPRECATED HBV CORE AB SER IA-ACNC: 154 NG/ML
DEPRECATED RDW RBC AUTO: 40.9 FL (ref 35.1–46.3)
EOSINOPHIL # BLD AUTO: 0.22 X10(3) UL (ref 0–0.7)
EOSINOPHIL NFR BLD AUTO: 2.9 %
ERYTHROCYTE [DISTWIDTH] IN BLOOD BY AUTOMATED COUNT: 13.2 % (ref 11–15)
HAV AB SER QL IA: NONREACTIVE
HBV CORE AB SERPL QL IA: NONREACTIVE
HBV SURFACE AB SER QL: REACTIVE
HBV SURFACE AB SERPL IA-ACNC: 55.79 MIU/ML
HBV SURFACE AG SERPL QL IA: NONREACTIVE
HCT VFR BLD AUTO: 35.4 %
HCV AB SERPL QL IA: NONREACTIVE
HGB BLD-MCNC: 11.5 G/DL
IMM GRANULOCYTES # BLD AUTO: 0.02 X10(3) UL (ref 0–1)
IMM GRANULOCYTES NFR BLD: 0.3 %
IRON SATN MFR SERPL: 20 %
IRON SERPL-MCNC: 44 UG/DL
LDH SERPL L TO P-CCNC: 162 U/L
LYMPHOCYTES # BLD AUTO: 2.35 X10(3) UL (ref 1–4)
LYMPHOCYTES NFR BLD AUTO: 31.5 %
MCH RBC QN AUTO: 27.8 PG (ref 26–34)
MCHC RBC AUTO-ENTMCNC: 32.5 G/DL (ref 31–37)
MCV RBC AUTO: 85.5 FL
MONOCYTES # BLD AUTO: 0.67 X10(3) UL (ref 0.1–1)
MONOCYTES NFR BLD AUTO: 9 %
NEUTROPHILS # BLD AUTO: 4.11 X10 (3) UL (ref 1.5–7.7)
NEUTROPHILS # BLD AUTO: 4.11 X10(3) UL (ref 1.5–7.7)
NEUTROPHILS NFR BLD AUTO: 55 %
PLATELET # BLD AUTO: 424 10(3)UL (ref 150–450)
RBC # BLD AUTO: 4.14 X10(6)UL
TIBC SERPL-MCNC: 225 UG/DL (ref 250–425)
TRANSFERRIN SERPL-MCNC: 151 MG/DL (ref 250–380)
WBC # BLD AUTO: 7.5 X10(3) UL (ref 4–11)

## 2025-01-14 NOTE — CONSULTS
[facility] Hematology/Oncology Consultation Note    Patient Name: Christine Francis   YOB: 1970   Medical Record Number: [id number]   CSN: [id number]   Consulting Physician: Martínez Celeste MD  Referring Physician(s): OFELIA Mendiola  Date of Consultation: 2025     Reason for Consultation:    1. Abnormal serum protein electrophoresis      History of Present Illness:   Christine Francis is a 54 year old female seen today in the Hematology Clinic  for an abnormal SPEP.  She had presented to her PCP for a regular physical and was found to have an elevated total protein.  This prompted a serum protein electropheresis that suggested abnormal band in the beta region however immunofixation was not performed.    CBC done on 24 showed WBC 9 K, platelet count of 360 K and Hemoglobin and Hematocrit of 11.4/36.2.  She does have some chronic kidney disease with a creatinine 1.7.  Calcium was normal at 10.4.  She has previously had anemia due to menorrhagia but this has since stopped     She  has not received blood transfusion before. She has noted no swollen glands in neck, axillary or inguinal areas, she has lost about 35 lbs intentionally since . She denies epistaxis, gum bleeding, hematuria, melana or BRBPR.  She denies family history of bleeding and clotting diathesis and recurrent miscarriages.        Past Medical History:  Past Medical History:    Essential hypertension    Fibroids    surgery       Past Surgical History:  Past Surgical History:   Procedure Laterality Date          Colonoscopy N/A 2021    Procedure: COLONOSCOPY;  Surgeon: Kelechi Sanabria MD;  Location: [facility] ENDOSCOPY    Other surgical history  2015    Root Canal Extraction       Family Medical History:  Family History   Problem Relation Age of Onset    Pulmonary Disease Mother         COPD    Cancer Mother 68        cervix    Hypertension Maternal Grandmother     Breast Cancer  Neg     Ovarian Cancer Neg        Gyne History:  OB History    Para Term  AB Living   1 1 1 0 0 1   SAB IAB Ectopic Multiple Live Births   0 0 0 0 1       Social History:  Social History     Socioeconomic History    Marital status: Legally      Spouse name: Not on file    Number of children: Not on file    Years of education: Not on file    Highest education level: Not on file   Occupational History    Not on file   Tobacco Use    Smoking status: Never    Smokeless tobacco: Never   Vaping Use    Vaping status: Never Used   Substance and Sexual Activity    Alcohol use: Yes     Comment: very rare     Drug use: No    Sexual activity: Never     Partners: Male   Other Topics Concern     Service Not Asked    Blood Transfusions Not Asked    Caffeine Concern Yes     Comment: coffee, 2 cups/day    Occupational Exposure Not Asked    Hobby Hazards Not Asked    Sleep Concern Not Asked    Stress Concern Not Asked    Weight Concern Not Asked    Special Diet Not Asked    Back Care Not Asked    Exercise Not Asked    Bike Helmet Not Asked    Seat Belt Not Asked    Self-Exams Not Asked   Social History Narrative    Not on file     Social Drivers of Health     Financial Resource Strain: Not on file   Food Insecurity: Not on file   Transportation Needs: Not on file   Physical Activity: Not on file   Stress: Not on file   Social Connections: Unknown (3/18/2021)    [facility]    Social Connections     Conversations with friends/family/neighbors per week: Not on file   Housing Stability: Low Risk  (2021)    [facility]    Housing Stability     Mortgage Payment Concerns?: Not on file     Number of Places Lived in the Last Year: Not on file     Unstable Housing?: Not on file       Allergies:   Allergies[1]    Current Medications:    Current Outpatient Medications:      amLODIPine 10 MG Oral Tab, Take 1 tablet (10 mg total) by mouth daily., Disp: 90 tablet, Rfl: 0    mupirocin 2 % External Ointment, Apply 1 Application topically 3 (three) times daily., Disp: 15 g, Rfl: 1    omeprazole 20 MG Oral Capsule Delayed Release, Take 1 capsule (20 mg total) by mouth every morning., Disp: 60 capsule, Rfl: 5    clotrimazole-betamethasone 1-0.05 % External Cream, Apply 1 Application  topically 2 (two) times daily as needed., Disp: 45 g, Rfl: 3    Vitamin D3 50 MCG (2000 UT) Oral Cap, Take 1 capsule (2,000 Units total) by mouth daily., Disp: , Rfl:     Review of Systems:  Pertinent positives and negatives noted in the the HPI.     Physical Examination:  /73 (BP Location: Right arm, Patient Position: Sitting, Cuff Size: large)   Pulse 81   Temp 97.4 °F (36.3 °C) (Oral)   Resp 18   Ht 1.638 m (5' 4.5\")   Wt 98.8 kg (217 lb 12.8 oz)   SpO2 99%   BMI 36.81 kg/m²    General: Patient is alert and oriented x 3, not in acute distress.  HEENT: EOMs intact. PERRL. Oropharynx is clear.   Neck: No JVD. No palpable lymphadenopathy. Neck is supple.  Lymphatics: There is no palpable lymphadenopathy throughout in the cervical, supraclavicular, or axillary regions.  Chest: Clear to auscultation. No wheezes or rales.  Heart: Regular rate and rhythm. S1S2 normal.  Abdomen: Soft, non tender.  No hepatosplenomegaly.  No palpable mass.  Extremities: No edema or calf tenderness.   Neurological: Grossly intact.       Labs:    Lab Results   Component Value Date/Time    WBC 9.0 12/24/2024 08:38 AM    RBC 4.13 12/24/2024 08:38 AM    HGB 11.4 (L) 12/24/2024 08:38 AM    HCT 36.2 12/24/2024 08:38 AM    MCV 87.7 12/24/2024 08:38 AM    MCH 27.6 12/24/2024 08:38 AM    MCHC 31.5 12/24/2024 08:38 AM    RDW 13.1 12/24/2024 08:38 AM    NEPRELIM 5.39 01/09/2023 09:21 AM    .0 12/24/2024 08:38 AM       Impression:  Diagnosis  1. Abnormal serum protein electrophoresis    54-year-old female recently found to have  elevated total protein.  SPEP suggested an abnormal band in the beta region however immunofixation was not performed previously.  She also has some chronic kidney disease but no significant anemia or hypercalcemia.    This may represent MGUS but she needs completion immunofixation and free light chain analysis.    Plan:  Will repeat a monoclonal protein profile along with and LDH and beta-2 microglobulin.  If she has a non-IgG monoclonal protein then we will need to consider a bone marrow biopsy to evaluate for clonal plasmacytosis.  Otherwise would recommend monitoring with a repeat CBC BMP and SPEP in 6 months  Anemia: mild, normocytic/normochromic, ?from CKD, check nutritional testing      Thank you OFELIA Mendiola for the opportunity to participate in the care of this interesting patient. Please do contact me if I may be of any further assistance    Martínez Celeste MD  [facility]            [1] No Known Allergies

## 2025-01-16 LAB
ALBUMIN SERPL ELPH-MCNC: 3.43 G/DL (ref 3.75–5.21)
ALBUMIN/GLOB SERPL: 0.81 {RATIO} (ref 1–2)
ALPHA1 GLOB SERPL ELPH-MCNC: 0.32 G/DL (ref 0.19–0.46)
ALPHA2 GLOB SERPL ELPH-MCNC: 1.1 G/DL (ref 0.48–1.05)
B-GLOBULIN SERPL ELPH-MCNC: 2.09 G/DL (ref 0.68–1.23)
GAMMA GLOB SERPL ELPH-MCNC: 0.75 G/DL (ref 0.62–1.7)
KAPPA LC FREE SER-MCNC: 6.25 MG/DL (ref 0.33–1.94)
KAPPA LC FREE/LAMBDA FREE SER NEPH: 0.66 {RATIO} (ref 0.26–1.65)
LAMBDA LC FREE SERPL-MCNC: 9.44 MG/DL (ref 0.57–2.63)
PROT SERPL-MCNC: 7.7 G/DL (ref 5.7–8.2)

## 2025-01-17 ENCOUNTER — TELEPHONE (OUTPATIENT)
Age: 55
End: 2025-01-17

## 2025-01-17 DIAGNOSIS — R77.8 ABNORMAL SERUM PROTEIN ELECTROPHORESIS: ICD-10-CM

## 2025-01-17 DIAGNOSIS — D64.9 NORMOCHROMIC ANEMIA: ICD-10-CM

## 2025-01-17 NOTE — TELEPHONE ENCOUNTER
Called patient, per Dr. 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  I recommend continued monitoring with repeat monoclonal protein study/CBC/LDH/BMP in 6 months. For now her labs will be monitored every 6 months and that is all that is required. She voiced understanding and thanked me for the call.

## 2025-01-21 ENCOUNTER — HOSPITAL ENCOUNTER (OUTPATIENT)
Dept: MAMMOGRAPHY | Age: 55
Discharge: HOME | End: 2025-01-21
Attending: NURSE PRACTITIONER
Payer: COMMERCIAL

## 2025-01-21 DIAGNOSIS — Z12.31 ENCOUNTER FOR SCREENING MAMMOGRAM FOR MALIGNANT NEOPLASM OF BREAST: ICD-10-CM

## 2025-01-21 PROCEDURE — 77067 SCR MAMMO BI INCL CAD: CPT | Performed by: NURSE PRACTITIONER

## 2025-01-21 PROCEDURE — 77063 BREAST TOMOSYNTHESIS BI: CPT | Performed by: NURSE PRACTITIONER

## 2025-02-03 ENCOUNTER — HOSPITAL ENCOUNTER (OUTPATIENT)
Dept: ULTRASOUND IMAGING | Facility: HOSPITAL | Age: 55
Discharge: HOME | End: 2025-02-03
Attending: NURSE PRACTITIONER
Payer: COMMERCIAL

## 2025-02-03 ENCOUNTER — HOSPITAL ENCOUNTER (OUTPATIENT)
Dept: MAMMOGRAPHY | Facility: HOSPITAL | Age: 55
Discharge: HOME | End: 2025-02-03
Attending: NURSE PRACTITIONER
Payer: COMMERCIAL

## 2025-02-03 DIAGNOSIS — R92.8 ABNORMALITY OF BREAST ON SCREENING MAMMOGRAPHY: Primary | ICD-10-CM

## 2025-02-03 DIAGNOSIS — R92.8 ABNORMAL MAMMOGRAM: ICD-10-CM

## 2025-02-03 PROCEDURE — 77065 DX MAMMO INCL CAD UNI: CPT | Performed by: NURSE PRACTITIONER

## 2025-02-03 PROCEDURE — 77061 BREAST TOMOSYNTHESIS UNI: CPT | Performed by: NURSE PRACTITIONER

## 2025-02-03 PROCEDURE — 76642 ULTRASOUND BREAST LIMITED: CPT | Performed by: NURSE PRACTITIONER

## 2025-02-03 NOTE — H&P
[facility] - Gastroenterology                                                                                                               Reason for consult: eval    Requesting physician or provider: Terell Linda MD    Chief Complaint   Patient presents with    Colonoscopy Screening     Last colon 3 years ago       HPI:   Christine Francis is a 54 year old year-old female with history of fibroids, htn:    she is here today for evaluation  Crc screening, gerd    She has had allergies and makes her vomit  Has had redness in sclera of her eye -   She denies pain and/or vision change  Has appt with pcp    she moves her bowels daily. No brbpr and/or melena.     she has gerd.progressive worsening of sx in dec. Changed diet and started omeprazole in dec. Takes ppi every other day approx.. she denies dysphagia, odynophagia and/or globus. she denies abdominal pain. she denies nausea. Has vomiting with cough, pnd.   she denies recent change in appetite and/or unintentional weight loss.    Iron def anemia 1/2025  Historically related to period  Post-menopausal approx 3 y ago  Saw hematology    Abnormal mammogram - plan to f/U in 6 mos     NSAIDS: no   Tobacco: no  Alcohol: no  Marijuana: no  Illicit drugs: no    No FH GI malignancy  Mother had cervical ca    No history of adverse reaction to sedation  No BRIAN  No anticoagulants  No pacemaker/defibrillator  No pain medications and/or sleep aides      Last colonoscopy: 5/2021 w/ Dr. Sanabria    Findings:   1. One polyp(s) noted as follows:      A. 3 mm polyp in the sigmoid colon; flat, hyperplastic appearing morphology; cold biopsy polypectomy and retrieved.     2. Diverticulosis: none appreciated.     3. Terminal ileum: the visualized mucosa appeared normal.     4. A retroflexed view of the rectum revealed hemorrhoids.     5. The colonic mucosa throughout the colon  showed normal vascular pattern, without evidence of angioectasias or inflammation.      6. MILIND: normal rectal tone, no masses palpated.         Recommend:  Await pathology. The interval for the next colonoscopy will be determined after reviewing pathology. If new signs or symptoms develop, colonoscopy may need to be repeated sooner.   High fiber diet.  Monitor for blood in the stool. If having more than just tinge of blood, call office or go to the ER.    Final Diagnosis:      Sigmoid colon polyp; polypectomy:   Fragment of hyperplastic polyp.     10 y crc screening interval advised    Last EGD: no    Wt Readings from Last 6 Encounters:   25 221 lb (100.2 kg)   25 217 lb 12.8 oz (98.8 kg)   25 218 lb 3.2 oz (99 kg)   24 222 lb 9.6 oz (101 kg)   02/15/24 233 lb (105.7 kg)   23 239 lb (108.4 kg)        History, Medications, Allergies, ROS:      Past Medical History:    Essential hypertension    Fibroids    surgery      Past Surgical History:   Procedure Laterality Date          Colonoscopy N/A 2021    Procedure: COLONOSCOPY;  Surgeon: Kelechi Sanabria MD;  Location: [facility] ENDOSCOPY    Other surgical history  2015    Root Canal Extraction      Family Hx:   Family History   Problem Relation Age of Onset    Pulmonary Disease Mother         COPD    Cancer Mother 68        cervix    Hypertension Maternal Grandmother     Breast Cancer Neg     Ovarian Cancer Neg       Social History:   Social History     Socioeconomic History    Marital status: Legally    Tobacco Use    Smoking status: Never    Smokeless tobacco: Never   Vaping Use    Vaping status: Never Used   Substance and Sexual Activity    Alcohol use: Not Currently     Comment: very rare     Drug use: No    Sexual activity: Never     Partners: Male   Other Topics Concern    Caffeine Concern Yes     Comment: coffee, 2 cups/day     Social Drivers of Health      Received from [facility]    Housing Stability        Medications (Active prior to today's visit):  Current Outpatient Medications   Medication Sig Dispense Refill    amLODIPine 10 MG Oral Tab Take 1 tablet (10 mg total) by mouth daily. 90 tablet 0    mupirocin 2 % External Ointment Apply 1 Application topically 3 (three) times daily. 15 g 1    omeprazole 20 MG Oral Capsule Delayed Release Take 1 capsule (20 mg total) by mouth every morning. 60 capsule 5    clotrimazole-betamethasone 1-0.05 % External Cream Apply 1 Application  topically 2 (two) times daily as needed. 45 g 3    Vitamin D3 50 MCG (2000 UT) Oral Cap Take 1 capsule (2,000 Units total) by mouth daily.         Allergies:  Allergies[1]    ROS:   CONSTITUTIONAL: negative for fevers, chills, sweats and weight loss  EYES Negative for red eyes, yellow eyes, changes in vision  HEENT: Negative for dysphagia and hoarseness  RESPIRATORY: Negative for cough and shortness of breath  CARDIOVASCULAR: Negative for chest pain, palpitations  GASTROINTESTINAL: See HPI  GENITOURINARY: Negative for dysuria and frequency  MUSCULOSKELETAL: Negative for arthralgias and myalgias  NEUROLOGICAL: Negative for dizziness and headaches  BEHAVIOR/PSYCH: Negative for anxiety and poor appetite    PHYSICAL EXAM:   Blood pressure 118/73, weight 221 lb (100.2 kg), not currently breastfeeding.    GEN: WD/WN, NAD  HEENT: Supple symmetrical, trachea midline  CV: RRR, the extremities are warm and well perfused   LUNGS: No increased work of breathing  ABDOMEN: No scars, normal bowel sounds, soft, non-tender, non-distended no rebound or guarding, no masses, no hepatomegaly  MSK: No redness, no warmth, no swelling of joints  SKIN: No jaundice, no erythema, no rashes  HEMATOLOGIC: No bleeding, no bruising  NEURO: Alert and interactive, normal gait    Labs/Imaging/Procedures:     Patient's pertinent labs and imaging were reviewed and discussed with patient today.        .  ASSESSMENT/PLAN:    Christine Francis is a 54 year old year-old female with history of fibroids, htn:      #gerd  #crcs creening  #ivis  Gerd sx progressively worse. Has had increased mucus and cough likely 2/2 pnd that causes vomiting. Weight loss intentional. Has started omeprazole w/ some improvement in sx.  Has not had egd. Last cln 2021 w/ 3 left sided benign polyps removed.  10 y recall advised.  Noted downward trend in hgb. Iron def c/w ivis.  Post-menopausal. X 3 y. Seeing hematology. No fhx gi malignancy. Plan for bi-directional for w/U anemia.     -reflux diet modification  -omeprazole  -hpylori testing   -notify gi outcome of mammogram  -continue to follow with hematology  -er if condition decline    1. Schedule colonoscopy/egd with [facility] w/ Dr. Sanabria [Diagnosis:#gerd  #crcs creening  #ivis ]    2.  bowel prep from pharmacy ([facility])    3.   For cardiology patients and patients on blood thinners:  Please contact your cardiology clinic for clearance to proceed with the endoscopic procedure. If you are on blood thinners, please also confirm with your cardiologic clinic that you are able to hold the blood thinner per our recommendations.\"    BLOOD THINNER ORDERS:  -Hold for 48 hours (Xarelto, Eliquis, Pradaxa, Savaysa)  -Hold for 3 days (Pletal)  -Hold for 5 days (Coumadin, Plavix, Brilinta, Aggrenox)  -Hold for 7 days (Effient)     For endocrinology insulin patients:    Please contact your endocrinology clinic for insulin adjustment orders prior to your endoscopic procedure.    4. Read all bowel prep instructions carefully    5. AVOID seeds, nuts, popcorn, raw fruits and vegetables (cooked is okay) for 2-3 days before procedure    6.   If you start any NEW medication after your visit today, please notify us. Certain medications will need to be held before the procedure, or the procedure cannot be performed.     >>>Please note: if you were prescribed Suprep for the bowel prep and it is too expensive or  not covered by insurance, it is okay to substitute Trilyte (or any similar generic prep). This can be done by notifying the pharmacy or calling our office.      Orders This Visit:  No orders of the defined types were placed in this encounter.      Meds This Visit:  Requested Prescriptions      No prescriptions requested or ordered in this encounter       Imaging & Referrals:  None    ENDOSCOPIC RISK BENEFIT DISCUSSION: I described the procedure in great detail with the patient. I discussed the risks and benefits, including but not limited to: bleeding, perforation, infection, anesthesia complications, and even death. Patient will be NPO after midnight and will have a person physically present at time of pick-up to drive patient home. Patient verbalized understanding and agrees to proceed with procedure as planned.    Salma Chen, APRN   2/3/2025        This note was partially prepared using [employer] voice recognition dictation software. As a result, errors may occur. When identified, these errors have been corrected. While every attempt is made to correct errors during dictation, discrepancies may still exist.          [1] No Known Allergies

## 2025-02-06 ENCOUNTER — APPOINTMENT (OUTPATIENT)
Dept: GENERAL RADIOLOGY | Age: 55
End: 2025-02-06
Attending: NURSE PRACTITIONER
Payer: COMMERCIAL

## 2025-02-06 ENCOUNTER — OFFICE VISIT (OUTPATIENT)
Dept: GASTROENTEROLOGY | Facility: CLINIC | Age: 55
End: 2025-02-06
Payer: COMMERCIAL

## 2025-02-06 ENCOUNTER — TELEPHONE (OUTPATIENT)
Facility: CLINIC | Age: 55
End: 2025-02-06

## 2025-02-06 ENCOUNTER — HOSPITAL ENCOUNTER (OUTPATIENT)
Age: 55
Discharge: HOME | End: 2025-02-06
Payer: COMMERCIAL

## 2025-02-06 VITALS
HEART RATE: 83 BPM | SYSTOLIC BLOOD PRESSURE: 141 MMHG | OXYGEN SATURATION: 99 % | DIASTOLIC BLOOD PRESSURE: 69 MMHG | TEMPERATURE: 99 F | RESPIRATION RATE: 20 BRPM

## 2025-02-06 VITALS — WEIGHT: 221 LBS | SYSTOLIC BLOOD PRESSURE: 118 MMHG | BODY MASS INDEX: 37 KG/M2 | DIASTOLIC BLOOD PRESSURE: 73 MMHG

## 2025-02-06 DIAGNOSIS — J01.00 ACUTE NON-RECURRENT MAXILLARY SINUSITIS: Primary | ICD-10-CM

## 2025-02-06 DIAGNOSIS — J40 BRONCHITIS: ICD-10-CM

## 2025-02-06 DIAGNOSIS — H11.33 SUBCONJUNCTIVAL HEMORRHAGE OF BOTH EYES: ICD-10-CM

## 2025-02-06 DIAGNOSIS — D50.9 IRON DEFICIENCY ANEMIA, UNSPECIFIED IRON DEFICIENCY ANEMIA TYPE: ICD-10-CM

## 2025-02-06 DIAGNOSIS — K21.9 GASTROESOPHAGEAL REFLUX DISEASE, UNSPECIFIED WHETHER ESOPHAGITIS PRESENT: Primary | ICD-10-CM

## 2025-02-06 DIAGNOSIS — Z12.11 SPECIAL SCREENING FOR MALIGNANT NEOPLASMS, COLON: ICD-10-CM

## 2025-02-06 DIAGNOSIS — Z12.11 COLON CANCER SCREENING: ICD-10-CM

## 2025-02-06 PROCEDURE — 99214 OFFICE O/P EST MOD 30 MIN: CPT | Performed by: NURSE PRACTITIONER

## 2025-02-06 PROCEDURE — 71046 X-RAY EXAM CHEST 2 VIEWS: CPT | Performed by: NURSE PRACTITIONER

## 2025-02-06 PROCEDURE — 3078F DIAST BP <80 MM HG: CPT | Performed by: NURSE PRACTITIONER

## 2025-02-06 PROCEDURE — 3074F SYST BP LT 130 MM HG: CPT | Performed by: NURSE PRACTITIONER

## 2025-02-06 RX ORDER — POLYETHYLENE GLYCOL 3350, SODIUM CHLORIDE, SODIUM BICARBONATE, POTASSIUM CHLORIDE 420; 11.2; 5.72; 1.48 G/4L; G/4L; G/4L; G/4L
POWDER, FOR SOLUTION ORAL
Qty: 4000 ML | Refills: 0 | Status: SHIPPED | OUTPATIENT
Start: 2025-02-06

## 2025-02-06 RX ORDER — FLUTICASONE PROPIONATE 50 MCG
2 SPRAY, SUSPENSION (ML) NASAL DAILY
Qty: 16 G | Refills: 0 | Status: SHIPPED | OUTPATIENT
Start: 2025-02-06

## 2025-02-06 RX ORDER — DOXYCYCLINE 100 MG/1
100 CAPSULE ORAL EVERY 12 HOURS
Qty: 14 CAPSULE | Refills: 0 | Status: SHIPPED | OUTPATIENT
Start: 2025-02-06 | End: 2025-02-13

## 2025-02-06 RX ORDER — PREDNISONE 20 MG/1
40 TABLET ORAL DAILY
Qty: 10 TABLET | Refills: 0 | Status: SHIPPED | OUTPATIENT
Start: 2025-02-06 | End: 2025-02-11

## 2025-02-06 NOTE — ED INITIAL ASSESSMENT (HPI)
Pt presents to the IC with c/o a cough with post nasal drip and post tussive emesis. Epistaxis on Tuesday with post tussive emesis. Pt notes coughing so hard that she burst some blood vessels in her eye. Symptoms started more than a week ago.

## 2025-02-06 NOTE — PATIENT INSTRUCTIONS
-reflux diet modification  -omeprazole  -hpylori testing   -notify gi outcome of mammogram  -continue to follow with hematology  -er if condition decline    1. Schedule colonoscopy/egd with [facility] diaz/ Dr. Sanabria [Diagnosis:#gerd  #crcs creening  #ivis ]    2.  bowel prep from pharmacy (split trilyte)    3.   For cardiology patients and patients on blood thinners:  Please contact your cardiology clinic for clearance to proceed with the endoscopic procedure. If you are on blood thinners, please also confirm with your cardiologic clinic that you are able to hold the blood thinner per our recommendations.\"    BLOOD THINNER ORDERS:  -Hold for 48 hours (Xarelto, Eliquis, Pradaxa, Savaysa)  -Hold for 3 days (Pletal)  -Hold for 5 days (Coumadin, Plavix, Brilinta, Aggrenox)  -Hold for 7 days (Effient)     For endocrinology insulin patients:    Please contact your endocrinology clinic for insulin adjustment orders prior to your endoscopic procedure.    4. Read all bowel prep instructions carefully    5. AVOID seeds, nuts, popcorn, raw fruits and vegetables (cooked is okay) for 2-3 days before procedure    6.   If you start any NEW medication after your visit today, please notify us. Certain medications will need to be held before the procedure, or the procedure cannot be performed.     >>>Please note: if you were prescribed Suprep for the bowel prep and it is too expensive or not covered by insurance, it is okay to substitute Trilyte (or any similar generic prep). This can be done by notifying the pharmacy or calling our office.      Tips to Control Acid Reflux    To control acid reflux, you’ll need to make some basic diet and lifestyle changes. The simple steps outlined below may be all you’ll need to ease discomfort.   Watch what you eat  Don't have fatty foods or spicy foods.  Eat fewer acidic foods, such as citrus and tomato-based foods. These can increase symptoms.  Limit drinking alcohol, caffeine, and fizzy  beverages. All increase acid reflux.  Try limiting chocolate, peppermint, and spearmint. These can make acid reflux worse in some people.     Watch when you eat  Don't lie down for 3 hours after eating.  Don't snack before going to bed.     Raise your head  Raising your head and upper body by 4 to 6 inches helps limit reflux when you’re lying down. Put blocks under the head of your bed frame or a wedge under your mattress to raise it.   Other changes  Lose weight, if you need to  Don’t exercise near bedtime  Don't wear tight-fitting clothes  Limit aspirin and ibuprofen  Stop smoking     [employer] last reviewed this educational content on 6/1/2019  © 0922-6237 [employer] [address] [place] [address], [place] [address]. All rights reserved. This information is not intended as a substitute for professional medical care. Always follow your healthcare professional's instructions.

## 2025-02-06 NOTE — TELEPHONE ENCOUNTER
Schedulers- Patient was seen in office today, please call patient to schedule procedure per providers orders below. I reviewed and handed a copy of prep instructions with patient in office as well as medications. Patient is aware of different locations and our providers possibly booking out. No further questions asked.      1. Schedule colonoscopy/egd with [facility] w/ Dr. Sanabria [Diagnosis:#gerd  #crcs creening  #ivis ]     2.  bowel prep from pharmacy ("[facility]

## 2025-02-06 NOTE — ED PROVIDER NOTES
Patient Seen in: [facility]      History     Chief Complaint   Patient presents with    Eye Problem    Cough/URI     Stated Complaint: cough, runny nose, sore throat, headaches    Subjective:   53 y/o female with medical history as noted below presents with c/o post nasal drip, non prod cough.cough became worse the past week. Had dry nose with episode of epistaxis on Tuesday, that stopped on its on after a couple of minutes. Had episode of post tussive emesis after harsh coughing. States cough become so harsh that she burst blood vessel in her eyes. Patient endorses that she has been dealing with runny nose and cough for several months. No headache, eye pain,  visual changes, cp, sob                  Objective:     Past Medical History:    Essential hypertension    Fibroids    surgery              Past Surgical History:   Procedure Laterality Date          Colonoscopy N/A 2021    Procedure: COLONOSCOPY;  Surgeon: Kelechi Sanabria MD;  Location: [facility] ENDOSCOPY    Other surgical history  2015    Root Canal Extraction                Social History     Socioeconomic History    Marital status: Legally    Tobacco Use    Smoking status: Never    Smokeless tobacco: Never   Vaping Use    Vaping status: Never Used   Substance and Sexual Activity    Alcohol use: Not Currently     Comment: very rare     Drug use: No    Sexual activity: Never     Partners: Male   Other Topics Concern    Caffeine Concern Yes     Comment: coffee, 2 cups/day     Social Drivers of Health      [facility]    H[facility] Stability              Review of Systems   Constitutional:  Negative for chills and fever.   HENT:  Positive for nosebleeds (x 1 episode), postnasal drip and rhinorrhea. Negative for sore throat.    Respiratory:  Positive for cough. Negative for shortness of breath.    Gastrointestinal:  Positive for vomiting (post tussive).  Negative for abdominal pain, diarrhea and nausea.   Neurological:  Negative for headaches.   All other systems reviewed and are negative.      Positive for stated complaint: cough, runny nose, sore throat, headaches  Other systems are as noted in HPI.  Constitutional and vital signs reviewed.      All other systems reviewed and negative except as noted above.    Physical Exam     ED Triage Vitals [02/06/25 1032]   /69   Pulse 83   Resp 20   Temp 98.6 °F (37 °C)   Temp src Oral   SpO2 99 %   O2 Device None (Room air)       Current Vitals:   No data recorded      Physical Exam  Vitals and nursing note reviewed.   Constitutional:       General: She is not in acute distress.     Appearance: Normal appearance.   HENT:      Head: Normocephalic.      Right Ear: Tympanic membrane and external ear normal.      Left Ear: Tympanic membrane and external ear normal.      Nose: Congestion present.      Right Turbinates: Swollen.      Left Turbinates: Swollen.      Right Sinus: Maxillary sinus tenderness present.      Left Sinus: Maxillary sinus tenderness present.      Mouth/Throat:      Mouth: Mucous membranes are moist.      Pharynx: Oropharynx is clear. Uvula midline. Postnasal drip present.   Eyes:      General: Lids are normal. Vision grossly intact.      Extraocular Movements: Extraocular movements intact.      Conjunctiva/sclera:      Right eye: Hemorrhage present.      Left eye: Hemorrhage present.      Pupils: Pupils are equal, round, and reactive to light.      Comments: Right eye with subconjunctival hemorrhage medially. Left eye with subconjunctival hemorrhage laterally.    Cardiovascular:      Rate and Rhythm: Normal rate and regular rhythm.   Pulmonary:      Effort: Pulmonary effort is normal.      Breath sounds: Normal breath sounds.   Musculoskeletal:         General: Normal range of motion.   Skin:     General: Skin is warm and dry.      Capillary Refill: Capillary refill takes less than 2 seconds.    Neurological:      Mental Status: She is alert and oriented to person, place, and time.   Psychiatric:         Behavior: Behavior is cooperative.             ED Course   Labs Reviewed - No data to display  XR CHEST PA + LAT CHEST (CPT=71046)   Final Result   PROCEDURE: XR CHEST PA + LAT CHEST (CPT=71046)       COMPARISON: None.       INDICATIONS: Cough with post nasal drip for1 week.       TECHNIQUE:   Two views.         FINDINGS:    CARDIAC/VASC: The cardiomediastinal silhouette is within normal limits of    size.   MEDIAST/ISAURA: No visible mass or adenopathy.    LUNGS/PLEURA: No focal opacity, pleural effusion, or pneumothorax.  There    is no evidence of pulmonary edema.   BONES: Multilevel degenerative changes of the thoracic spine.  Bilateral    shoulder degenerative change.   OTHER: Negative.                     =====   CONCLUSION:        No focal opacity, pleural effusion, or pneumothorax.             Dictated by (CST): Lucio Avila MD on 2/06/2025 at 10:54 AM        Finalized by (CST): Lucio Avila MD on 2/06/2025 at 10:54 AM                               MDM              Medical Decision Making  Patient is well-appearing.  I discussed differentials with patient including but not limited to viral uri vs sinusitis vs bronchitis vs pneumonia  Chest xray independently reviewed and discussed with patient.negative for pneumonia  Push fluids, cool mist humidifier, saline nasal spray, good hand washing  Rx doxycycline, prednisone, flonase  otc meds prn  Fu with PCP.  ED precautions discussed      Problems Addressed:  Acute non-recurrent maxillary sinusitis: acute illness or injury  Bronchitis: acute illness or injury  Subconjunctival hemorrhage of both eyes: acute illness or injury    Amount and/or Complexity of Data Reviewed  Radiology: ordered. Decision-making details documented in ED Course.    Risk  OTC drugs.  Prescription drug management.        Disposition and Plan     Clinical Impression:  1. Acute  non-recurrent maxillary sinusitis    2. Bronchitis    3. Subconjunctival hemorrhage of both eyes         Disposition:  Discharge  2/6/2025 11:38 am    Follow-up:  Bess Farah APRN  [address] [facility] [phone]      keep your up coming appointment    Luisa Ruiz MD  [address] [place] [address] [id number] [phone]    Schedule an appointment as soon as possible for a visit             Medications Prescribed:  Discharge Medication List as of 2/6/2025 11:41 AM        START taking these medications    Details   doxycycline 100 MG Oral Cap Take 1 capsule (100 mg total) by mouth every 12 (twelve) hours for 7 days., Normal, Disp-14 capsule, R-0      predniSONE 20 MG Oral Tab Take 2 tablets (40 mg total) by mouth daily for 5 days., Normal, Disp-10 tablet, R-0      fluticasone propionate 50 MCG/ACT Nasal Suspension 2 sprays by Nasal route daily., Normal, Disp-16 g, R-0                 Supplementary Documentation:

## 2025-02-07 NOTE — TELEPHONE ENCOUNTER
Scheduled for:  Colonoscopy 43524    Provider Name: Dr Sanabria      Date:  7/7/2025    Location:    [facility]    Sedation:  MAC    Time:  810 am (Patient made aware EM will call the day before with procedure/arrival time)    Prep:  Trilyte    Meds/Allergies Reconciled?:  Physician reviewed     Diagnosis with codes:    Gastroesophageal reflux disease, unspecified whether esophagitis present [K21.9]   Special screening for malignant neoplasms, colon [Z12.11]  Iron deficiency anemia, unspecified iron deficiency anemia type [D50.9]          Was patient informed to call insurance with codes (Y/N):  Yes, I confirmed [facility] with the patient.     Referral sent?:  N/A    EM or [facility] notified?:  I sent an electronic request to Endo Scheduling and received a confirmation today.      Medication Orders:  This patient verbally confirmed that she is not taking:    Iron, blood thinners, BP meds, and is not diabetic    No latex allergy, No PCN allergy and does not have a pacemaker     Misc Orders:       Further instructions given by staff:   I discussed the prep instructions with the patient which she verbally understood and is aware that I will send the instructions today via [employer].    Advised patient:    You will not be able to drive, operate machinery or make critical decisions the day of your procedure. Please make arrangements for transportation. You must have a  (age 18 or older) to accompany you, stay in the facility for the duration of your procedure and drive you home after the procedure.  You cannot use public transportation (Uber, Lyft, Taxi). The procedure involves sedation, and you will not be allowed to leave unaccompanied. Your procedure will not proceed forward if you're unable to confirm your  planned to escort you home.    Advised Patient:    [facility] requires payment of copay and any patient responsibility at the time of registration.   The [facility] requires copay and 50% of the patient responsibility  at the time of service for all Esophagogastroduodenoscopy and diagnostic Colonoscopies.     They do offer payment plans and Care Credit options if unable to pay the full amount at the time of registration.     If you have any questions regarding your potential responsibility, please contact [facility] Insurance Department at [phone] option 1.    You may receive 4 bills related to your medical procedure:   [facility] (the facility)  The procedural physician  The anesthesiologist  The pathology lab (if applicable)  '

## 2025-02-17 ENCOUNTER — LAB ENCOUNTER (OUTPATIENT)
Dept: LAB | Age: 55
End: 2025-02-17
Attending: NURSE PRACTITIONER
Payer: COMMERCIAL

## 2025-02-17 DIAGNOSIS — N18.32 STAGE 3B CHRONIC KIDNEY DISEASE (HCC): ICD-10-CM

## 2025-02-17 LAB
ALBUMIN SERPL-MCNC: 3.7 G/DL (ref 3.2–4.8)
ALBUMIN/GLOB SERPL: 1 {RATIO} (ref 1–2)
ALP LIVER SERPL-CCNC: 84 U/L
ALT SERPL-CCNC: 12 U/L
ANION GAP SERPL CALC-SCNC: 9 MMOL/L (ref 0–18)
AST SERPL-CCNC: 15 U/L (ref ?–34)
BILIRUB SERPL-MCNC: 0.3 MG/DL (ref 0.3–1.2)
BUN BLD-MCNC: 15 MG/DL (ref 9–23)
BUN/CREAT SERPL: 10.1 (ref 10–20)
CALCIUM BLD-MCNC: 9.5 MG/DL (ref 8.7–10.4)
CHLORIDE SERPL-SCNC: 104 MMOL/L (ref 98–112)
CO2 SERPL-SCNC: 28 MMOL/L (ref 21–32)
CREAT BLD-MCNC: 1.49 MG/DL
EGFRCR SERPLBLD CKD-EPI 2021: 41 ML/MIN/1.73M2 (ref 60–?)
FASTING STATUS PATIENT QL REPORTED: YES
GLOBULIN PLAS-MCNC: 3.7 G/DL (ref 2–3.5)
GLUCOSE BLD-MCNC: 75 MG/DL (ref 70–99)
OSMOLALITY SERPL CALC.SUM OF ELEC: 292 MOSM/KG (ref 275–295)
POTASSIUM SERPL-SCNC: 4 MMOL/L (ref 3.5–5.1)
PROT SERPL-MCNC: 7.4 G/DL (ref 5.7–8.2)
SODIUM SERPL-SCNC: 141 MMOL/L (ref 136–145)

## 2025-02-17 PROCEDURE — 80053 COMPREHEN METABOLIC PANEL: CPT

## 2025-02-17 PROCEDURE — 36415 COLL VENOUS BLD VENIPUNCTURE: CPT

## 2025-02-20 ENCOUNTER — OFFICE VISIT (OUTPATIENT)
Dept: INTERNAL MEDICINE CLINIC | Facility: CLINIC | Age: 55
End: 2025-02-20
Payer: COMMERCIAL

## 2025-02-20 VITALS
DIASTOLIC BLOOD PRESSURE: 67 MMHG | WEIGHT: 221 LBS | TEMPERATURE: 99 F | RESPIRATION RATE: 18 BRPM | SYSTOLIC BLOOD PRESSURE: 108 MMHG | HEIGHT: 64.5 IN | BODY MASS INDEX: 37.27 KG/M2 | HEART RATE: 79 BPM | OXYGEN SATURATION: 98 %

## 2025-02-20 DIAGNOSIS — R77.8 ABNORMAL SERUM PROTEIN ELECTROPHORESIS: ICD-10-CM

## 2025-02-20 DIAGNOSIS — I10 ESSENTIAL HYPERTENSION: Primary | ICD-10-CM

## 2025-02-20 DIAGNOSIS — N18.32 STAGE 3B CHRONIC KIDNEY DISEASE (HCC): ICD-10-CM

## 2025-02-20 PROCEDURE — 99214 OFFICE O/P EST MOD 30 MIN: CPT | Performed by: NURSE PRACTITIONER

## 2025-02-20 PROCEDURE — 3078F DIAST BP <80 MM HG: CPT | Performed by: NURSE PRACTITIONER

## 2025-02-20 PROCEDURE — 3074F SYST BP LT 130 MM HG: CPT | Performed by: NURSE PRACTITIONER

## 2025-02-20 PROCEDURE — 3008F BODY MASS INDEX DOCD: CPT | Performed by: NURSE PRACTITIONER

## 2025-02-20 NOTE — ASSESSMENT & PLAN NOTE
CKD 41 - Improved  Asymptomatic with positive urine culture. (Fatigue)  Patient states she feels much better    Plan  Patient was treated with antibiotics for positive urine culture.  Repeat CMP in 3 months  Repeat urine culture

## 2025-02-20 NOTE — ASSESSMENT & PLAN NOTE
Blood pressure 108/67, pulse 79, temperature 98.5 °F (36.9 °C), temperature source Oral, resp. rate 18, height 5' 4.5\" (1.638 m), weight 221 lb (100.2 kg), SpO2 98%, not currently breastfeeding.       amLODIPine 10 MG Oral Tab                Take 1 tablet (10 mg total) by mouth daily., Normal, Disp-90 tablet, R-0

## 2025-02-20 NOTE — PROGRESS NOTES
HPI:    Patient ID: Christine Francis is a 54 year old female.    HPI Follow up  54-year-old pleasant female I recently seen this year.  She has been having declining kidney function with a GFR in the 30s.  Urine culture showed infection and she was treated with antibiotics.    Abnormal Protein Electrophoresis  She had an elevated total protein.  She also has some chronic kidney disease but no significant anemia or hypercalcemia.   She will follow with hematology and a repeat of her monoclonal protein profile with a LDH and beta-2 microglobulin will be completed.  She will follow her labs with hematology.    CKD - 41 This is improved  The patient will continue      Blood pressure 108/67, pulse 79, temperature 98.5 °F (36.9 °C), temperature source Oral, resp. rate 18, height 5' 4.5\" (1.638 m), weight 221 lb (100.2 kg), SpO2 98%, not currently breastfeeding.       Immunization History   Administered Date(s) Administered    Covid-19 Vaccine Moderna 100 mcg/0.5 ml 2021, 2021, 2021, 2022    Covid-19 Vaccine Moderna 50 Mcg/0.25 Ml 2022    Influenza 2017, 2018    Influenza Vaccine, trivalent (IIV3), PF 0.5mL (15794) 2024    TDAP 2020       Past Medical History:    Essential hypertension    Fibroids    surgery      Past Surgical History:   Procedure Laterality Date          Colonoscopy N/A 2021    Procedure: COLONOSCOPY;  Surgeon: Kelechi Sanabria MD;  Location: [facility] ENDOSCOPY    Other surgical history  2015    Root Canal Extraction      Social History     Socioeconomic History    Marital status: Legally    Tobacco Use    Smoking status: Never    Smokeless tobacco: Never   Vaping Use    Vaping status: Never Used   Substance and Sexual Activity    Alcohol use: Not Currently     Comment: very rare     Drug use: No    Sexual activity: Never     Partners: Male   Other Topics Concern    Caffeine Concern Yes     Comment: coffee, 2  cups/day          Review of Systems   Constitutional:  Negative for chills, fatigue and fever.   HENT:  Negative for ear pain, hearing loss, sinus pain, sore throat and trouble swallowing.    Eyes:  Negative for pain and visual disturbance.   Respiratory:  Negative for cough, chest tightness and shortness of breath.    Cardiovascular:  Negative for chest pain, palpitations and leg swelling.   Gastrointestinal:  Negative for abdominal pain, constipation, diarrhea, nausea and vomiting.   Endocrine: Negative for cold intolerance and heat intolerance.   Genitourinary:  Negative for dysuria and hematuria.   Musculoskeletal:  Negative for back pain and joint swelling.   Skin:  Negative for rash.   Allergic/Immunologic: Negative for environmental allergies.   Neurological:  Negative for weakness, numbness and headaches.   Hematological:  Does not bruise/bleed easily.   Psychiatric/Behavioral:  Negative for dysphoric mood and sleep disturbance. The patient is not nervous/anxious.               Current Outpatient Medications   Medication Sig Dispense Refill    PEG 3350-KCl-Na Bicarb-NaCl (TRILYTE) 420 g Oral Recon Soln Take prep as directed by gastro office. May substitute with Trilyte/generic equivalent if needed. 4000 mL 0    fluticasone propionate 50 MCG/ACT Nasal Suspension 2 sprays by Nasal route daily. 16 g 0    amLODIPine 10 MG Oral Tab Take 1 tablet (10 mg total) by mouth daily. 90 tablet 0    mupirocin 2 % External Ointment Apply 1 Application topically 3 (three) times daily. 15 g 1    omeprazole 20 MG Oral Capsule Delayed Release Take 1 capsule (20 mg total) by mouth every morning. 60 capsule 5    clotrimazole-betamethasone 1-0.05 % External Cream Apply 1 Application  topically 2 (two) times daily as needed. 45 g 3    Vitamin D3 50 MCG (2000 UT) Oral Cap Take 1 capsule (2,000 Units total) by mouth daily.       Allergies:Allergies[1]   PHYSICAL EXAM:   Physical Exam  Constitutional:       Appearance: Normal  appearance. She is well-developed.   HENT:      Head: Normocephalic.      Right Ear: Tympanic membrane normal.      Left Ear: Tympanic membrane normal.      Nose: Nose normal.      Mouth/Throat:      Mouth: Mucous membranes are moist.      Pharynx: No oropharyngeal exudate or posterior oropharyngeal erythema.   Eyes:      General:         Right eye: No discharge.         Left eye: No discharge.      Pupils: Pupils are equal, round, and reactive to light.   Cardiovascular:      Rate and Rhythm: Normal rate and regular rhythm.      Heart sounds: Normal heart sounds. No murmur heard.     No friction rub. No gallop.   Pulmonary:      Effort: Pulmonary effort is normal. No respiratory distress.      Breath sounds: Normal breath sounds. No wheezing, rhonchi or rales.   Abdominal:      General: Bowel sounds are normal. There is no distension.      Palpations: Abdomen is soft. There is no mass.      Tenderness: There is no abdominal tenderness. There is no right CVA tenderness, left CVA tenderness or guarding.   Musculoskeletal:         General: No tenderness.      Cervical back: Normal range of motion and neck supple. No tenderness.      Right lower leg: No edema.      Left lower leg: No edema.   Lymphadenopathy:      Cervical: No cervical adenopathy.   Skin:     General: Skin is warm and dry.      Findings: No rash.   Neurological:      Mental Status: She is alert and oriented to person, place, and time.      Coordination: Coordination normal.      Gait: Gait normal.   Psychiatric:         Mood and Affect: Mood normal.         Behavior: Behavior normal.         Thought Content: Thought content normal.         Judgment: Judgment normal.       /67 (BP Location: Right arm, Patient Position: Sitting, Cuff Size: large)   Pulse 79   Temp 98.5 °F (36.9 °C) (Oral)   Resp 18   Ht 5' 4.5\" (1.638 m)   Wt 221 lb (100.2 kg)   SpO2 98%   BMI 37.35 kg/m²   Wt Readings from Last 2 Encounters:   02/20/25 221 lb (100.2 kg)    02/06/25 221 lb (100.2 kg)     Body mass index is 37.35 kg/m².(2)  Lab Results   Component Value Date    WBC 7.5 01/14/2025    RBC 4.14 01/14/2025    HGB 11.5 (L) 01/14/2025    HCT 35.4 01/14/2025    MCV 85.5 01/14/2025    MCH 27.8 01/14/2025    MCHC 32.5 01/14/2025    RDW 13.2 01/14/2025    .0 01/14/2025    MPV 8.6 01/09/2019      Lab Results   Component Value Date    GLU 75 02/17/2025    BUN 15 02/17/2025    BUNCREA 10.1 02/17/2025    CREATSERUM 1.49 (H) 02/17/2025    ANIONGAP 9 02/17/2025    GFRNAA 66 11/24/2021    GFRAA 76 11/24/2021    CA 9.5 02/17/2025    OSMOCALC 292 02/17/2025    ALKPHO 84 02/17/2025    AST 15 02/17/2025    ALT 12 02/17/2025    ALKPHOS 120 (H) 02/29/2016    BILT 0.3 02/17/2025    TP 7.4 02/17/2025    ALB 3.7 02/17/2025    GLOBULIN 3.7 (H) 02/17/2025    AGRATIO 0.8 (L) 09/21/2015     02/17/2025    K 4.0 02/17/2025     02/17/2025    CO2 28.0 02/17/2025      Lab Results   Component Value Date     01/09/2023    A1C 5.7 (H) 01/09/2023      Lab Results   Component Value Date    CHOLEST 186 12/24/2024    TRIG 65 12/24/2024    HDL 38 (L) 12/24/2024     (H) 12/24/2024    VLDL 12 12/24/2024    NONHDLC 148 (H) 12/24/2024    CALCNONHDL 135 (H) 09/21/2015      Lab Results   Component Value Date    TSH 3.229 12/24/2024                ASSESSMENT/PLAN:     Problem List Items Addressed This Visit       Abnormal serum protein electrophoresis     Following with hemtatologist         Essential hypertension - Primary     Blood pressure 108/67, pulse 79, temperature 98.5 °F (36.9 °C), temperature source Oral, resp. rate 18, height 5' 4.5\" (1.638 m), weight 221 lb (100.2 kg), SpO2 98%, not currently breastfeeding.       amLODIPine 10 MG Oral Tab                Take 1 tablet (10 mg total) by mouth daily., Normal, Disp-90 tablet, R-0             Stage 3b chronic kidney disease (HCC)     CKD 41 - Improved  Asymptomatic with positive urine culture. (Fatigue)  Patient states she feels  much better    Plan  Patient was treated with antibiotics for positive urine culture.  Repeat CMP in 3 months  Repeat urine culture         Relevant Orders    Comp Metabolic Panel (14)    Urine Culture, Routine [E]          Orders Placed This Encounter   Procedures    Comp Metabolic Panel (14)    Urine Culture, Routine [E]       Meds This Visit:  Requested Prescriptions      No prescriptions requested or ordered in this encounter       Imaging & Referrals:  None         XIAO Mendiola        [1] No Known Allergies

## 2025-02-28 ENCOUNTER — OFFICE VISIT (OUTPATIENT)
Dept: NEPHROLOGY | Facility: CLINIC | Age: 55
End: 2025-02-28
Payer: COMMERCIAL

## 2025-02-28 VITALS
WEIGHT: 222 LBS | DIASTOLIC BLOOD PRESSURE: 76 MMHG | BODY MASS INDEX: 37.44 KG/M2 | SYSTOLIC BLOOD PRESSURE: 136 MMHG | HEART RATE: 81 BPM | HEIGHT: 64.5 IN

## 2025-02-28 DIAGNOSIS — N18.32 STAGE 3B CHRONIC KIDNEY DISEASE (HCC): Primary | ICD-10-CM

## 2025-02-28 PROCEDURE — 99205 OFFICE O/P NEW HI 60 MIN: CPT | Performed by: INTERNAL MEDICINE

## 2025-02-28 PROCEDURE — 3078F DIAST BP <80 MM HG: CPT | Performed by: INTERNAL MEDICINE

## 2025-02-28 PROCEDURE — 3075F SYST BP GE 130 - 139MM HG: CPT | Performed by: INTERNAL MEDICINE

## 2025-02-28 PROCEDURE — 3008F BODY MASS INDEX DOCD: CPT | Performed by: INTERNAL MEDICINE

## 2025-02-28 RX ORDER — AMLODIPINE BESYLATE 10 MG/1
5 TABLET ORAL DAILY
COMMUNITY
Start: 2025-02-28

## 2025-03-01 NOTE — PROGRESS NOTES
25        Patient: Christine Francis   YOB: 1970   Date of Visit: 2025       Dear  Dr. Maddi MD,      Thank you for referring Christine Francis to my practice.  Please find my assessment and plan below.    As you know she is a 54-year-old female with a history of hypertension who I now had the pleasure of seeing for what may be chronic kidney disease stage III.  Laboratory studies have been reviewed in care everywhere and in epic.  Back on 2020 when she had normal creatinine 0.99.  There is a gap until 2023 when her creatinine is 1.39.  She then had labs done on 2024 where creatinine crept up to 1.73 but then dropped down to 1.49 on 2025 with an estimated GFR of 41 cc/min.  Patient did have an asymptomatic UTI in 2024.    Her past medical history is significant for hypertension for at least 10 years.  Currently being followed by hematology for possible MGUS.  She states she has otherwise been in general good health.   1 para 1 in pregnancy was uneventful with no hypertension or proteinuria.  No history of recurrent UTIs.  She does check her blood pressures regularly at home and usually systolics in the 120s with diastolics in the 70s.  Medications are as listed.  Denies any significant use of nonsteroidals.    Social history non-smoker.  Family history she did have an aunt who was on dialysis.  Further details unclear.  Review of system patient was states she has been doing well without any chest pain, shortness of breath, GI or urinary tract symptoms.  Did have chronic cough which did get better when starting omeprazole in January of this year.    On physical exam her blood pressure is 136/76 with a pulse of 80 when she weighed 222 pounds.  Her neck was supple without JVD.  Lungs are clear.  Heart revealed a regular rate and rhythm with an S4 but no gallops, murmurs or rubs.  Abdomen was soft,  flat, nontender without organomegaly, masses or bruits.  Extremities revealed no clubbing, cyanosis or edema.    I therefore informed the patient that she may have chronic kidney disease stage III.  This may be secondary to hypertension but other causes need to be excluded.  For completion sake we will repeat a CBC, renal panel, urinalysis, urine for microalbumin, urine for Bence-Kim protein, sed rate, connective tissue profile, phospholipase, and a renal ultrasound have been ordered.  Maintain adequate hydration.  Avoid nonsteroidals.  Further impressions and recommendations will be forthcoming after reviewing the above.    Thank you very much for allowing me to participate in the care of your patient.  If you have any questions please were free to call.               Sincerely,   Eulogio Krause MD   [facility], [facility], [place]  [address]  [facility] [place] [address]    Document electronically generated by:  Eulogio Krause MD

## 2025-03-01 NOTE — PATIENT INSTRUCTIONS
Please do laboratory studies and kidney ultrasound as ordered.  We will contact you when all results are in.

## 2025-03-10 ENCOUNTER — LAB ENCOUNTER (OUTPATIENT)
Dept: LAB | Age: 55
End: 2025-03-10
Attending: INTERNAL MEDICINE
Payer: COMMERCIAL

## 2025-03-10 DIAGNOSIS — N18.32 STAGE 3B CHRONIC KIDNEY DISEASE (HCC): ICD-10-CM

## 2025-03-10 LAB
ALBUMIN SERPL-MCNC: 3.8 G/DL (ref 3.2–4.8)
ALBUMIN/GLOB SERPL: 1 {RATIO} (ref 1–2)
ALP LIVER SERPL-CCNC: 86 U/L
ALT SERPL-CCNC: 9 U/L
ANION GAP SERPL CALC-SCNC: 6 MMOL/L (ref 0–18)
AST SERPL-CCNC: 16 U/L (ref ?–34)
BASOPHILS # BLD AUTO: 0.12 X10(3) UL (ref 0–0.2)
BASOPHILS NFR BLD AUTO: 1.8 %
BILIRUB SERPL-MCNC: 0.4 MG/DL (ref 0.3–1.2)
BILIRUB UR QL: NEGATIVE
BUN BLD-MCNC: 15 MG/DL (ref 9–23)
BUN/CREAT SERPL: 10 (ref 10–20)
C3 SERPL-MCNC: 157.9 MG/DL (ref 90–170)
C4 SERPL-MCNC: 33.5 MG/DL (ref 12–36)
CALCIUM BLD-MCNC: 9.5 MG/DL (ref 8.7–10.4)
CHLORIDE SERPL-SCNC: 106 MMOL/L (ref 98–112)
CLARITY UR: CLEAR
CO2 SERPL-SCNC: 28 MMOL/L (ref 21–32)
COLOR UR: COLORLESS
CREAT BLD-MCNC: 1.5 MG/DL
CREAT UR-SCNC: 59.4 MG/DL
CRP SERPL-MCNC: 0.5 MG/DL (ref ?–1)
DEPRECATED RDW RBC AUTO: 44.7 FL (ref 35.1–46.3)
EGFRCR SERPLBLD CKD-EPI 2021: 41 ML/MIN/1.73M2 (ref 60–?)
EOSINOPHIL # BLD AUTO: 0.29 X10(3) UL (ref 0–0.7)
EOSINOPHIL NFR BLD AUTO: 4.3 %
ERYTHROCYTE [DISTWIDTH] IN BLOOD BY AUTOMATED COUNT: 13.6 % (ref 11–15)
ERYTHROCYTE [SEDIMENTATION RATE] IN BLOOD: 76 MM/HR
FASTING STATUS PATIENT QL REPORTED: YES
GLOBULIN PLAS-MCNC: 3.7 G/DL (ref 2–3.5)
GLUCOSE BLD-MCNC: 79 MG/DL (ref 70–99)
GLUCOSE UR-MCNC: NORMAL MG/DL
HCT VFR BLD AUTO: 33 %
HGB BLD-MCNC: 10.5 G/DL
HGB UR QL STRIP.AUTO: NEGATIVE
IMM GRANULOCYTES # BLD AUTO: 0.02 X10(3) UL (ref 0–1)
IMM GRANULOCYTES NFR BLD: 0.3 %
KETONES UR-MCNC: NEGATIVE MG/DL
LEUKOCYTE ESTERASE UR QL STRIP.AUTO: NEGATIVE
LYMPHOCYTES # BLD AUTO: 2.16 X10(3) UL (ref 1–4)
LYMPHOCYTES NFR BLD AUTO: 32 %
MCH RBC QN AUTO: 28.3 PG (ref 26–34)
MCHC RBC AUTO-ENTMCNC: 31.8 G/DL (ref 31–37)
MCV RBC AUTO: 88.9 FL
MICROALBUMIN UR-MCNC: 18.9 MG/DL
MICROALBUMIN/CREAT 24H UR-RTO: 318.2 UG/MG (ref ?–30)
MONOCYTES # BLD AUTO: 0.63 X10(3) UL (ref 0.1–1)
MONOCYTES NFR BLD AUTO: 9.3 %
NEUTROPHILS # BLD AUTO: 3.52 X10 (3) UL (ref 1.5–7.7)
NEUTROPHILS # BLD AUTO: 3.52 X10(3) UL (ref 1.5–7.7)
NEUTROPHILS NFR BLD AUTO: 52.3 %
NITRITE UR QL STRIP.AUTO: NEGATIVE
OSMOLALITY SERPL CALC.SUM OF ELEC: 290 MOSM/KG (ref 275–295)
PH UR: 6 [PH] (ref 5–8)
PHOSPHATE SERPL-MCNC: 4 MG/DL (ref 2.4–5.1)
PLATELET # BLD AUTO: 332 10(3)UL (ref 150–450)
POTASSIUM SERPL-SCNC: 4.1 MMOL/L (ref 3.5–5.1)
PROT SERPL-MCNC: 7.5 G/DL (ref 5.7–8.2)
PROT UR-MCNC: 30 MG/DL
PROT UR-MCNC: 43.7 MG/DL (ref ?–14)
RBC # BLD AUTO: 3.71 X10(6)UL
RHEUMATOID FACT SERPL-ACNC: 5.2 IU/ML (ref ?–14)
SODIUM SERPL-SCNC: 140 MMOL/L (ref 136–145)
SP GR UR STRIP: 1.01 (ref 1–1.03)
UROBILINOGEN UR STRIP-ACNC: NORMAL
WBC # BLD AUTO: 6.7 X10(3) UL (ref 4–11)

## 2025-03-10 PROCEDURE — 82570 ASSAY OF URINE CREATININE: CPT

## 2025-03-10 PROCEDURE — 83516 IMMUNOASSAY NONANTIBODY: CPT

## 2025-03-10 PROCEDURE — 84100 ASSAY OF PHOSPHORUS: CPT

## 2025-03-10 PROCEDURE — 80053 COMPREHEN METABOLIC PANEL: CPT

## 2025-03-10 PROCEDURE — 86038 ANTINUCLEAR ANTIBODIES: CPT

## 2025-03-10 PROCEDURE — 86140 C-REACTIVE PROTEIN: CPT

## 2025-03-10 PROCEDURE — 86160 COMPLEMENT ANTIGEN: CPT

## 2025-03-10 PROCEDURE — 85025 COMPLETE CBC W/AUTO DIFF WBC: CPT

## 2025-03-10 PROCEDURE — 84166 PROTEIN E-PHORESIS/URINE/CSF: CPT

## 2025-03-10 PROCEDURE — 82043 UR ALBUMIN QUANTITATIVE: CPT

## 2025-03-10 PROCEDURE — 86431 RHEUMATOID FACTOR QUANT: CPT

## 2025-03-10 PROCEDURE — 36415 COLL VENOUS BLD VENIPUNCTURE: CPT

## 2025-03-10 PROCEDURE — 84156 ASSAY OF PROTEIN URINE: CPT

## 2025-03-10 PROCEDURE — 86225 DNA ANTIBODY NATIVE: CPT

## 2025-03-10 PROCEDURE — 86235 NUCLEAR ANTIGEN ANTIBODY: CPT

## 2025-03-10 PROCEDURE — 86039 ANTINUCLEAR ANTIBODIES (ANA): CPT

## 2025-03-10 PROCEDURE — 81001 URINALYSIS AUTO W/SCOPE: CPT

## 2025-03-10 PROCEDURE — 86335 IMMUNFIX E-PHORSIS/URINE/CSF: CPT

## 2025-03-10 PROCEDURE — 87086 URINE CULTURE/COLONY COUNT: CPT

## 2025-03-10 PROCEDURE — 85652 RBC SED RATE AUTOMATED: CPT

## 2025-03-12 ENCOUNTER — HOSPITAL ENCOUNTER (OUTPATIENT)
Dept: ULTRASOUND IMAGING | Age: 55
Discharge: HOME | End: 2025-03-12
Attending: INTERNAL MEDICINE
Payer: COMMERCIAL

## 2025-03-12 DIAGNOSIS — N18.32 STAGE 3B CHRONIC KIDNEY DISEASE (HCC): ICD-10-CM

## 2025-03-12 PROCEDURE — 76770 US EXAM ABDO BACK WALL COMP: CPT | Performed by: INTERNAL MEDICINE

## 2025-03-13 ENCOUNTER — TELEPHONE (OUTPATIENT)
Dept: NEPHROLOGY | Facility: CLINIC | Age: 55
End: 2025-03-13

## 2025-03-13 DIAGNOSIS — N18.32 STAGE 3B CHRONIC KIDNEY DISEASE (HCC): Primary | ICD-10-CM

## 2025-03-13 NOTE — TELEPHONE ENCOUNTER
Informed patient of note  below  and last name verified,verbalized understanding.Rn generated order for MRI  Provided pt #[phone] to schedule.   Rituxan Counseling:  I discussed with the patient the risks of Rituxan infusions. Side effects can include infusion reactions, severe drug rashes including mucocutaneous reactions, reactivation of latent hepatitis and other infections and rarely progressive multifocal leukoencephalopathy.  All of the patient's questions and concerns were addressed.

## 2025-03-13 NOTE — TELEPHONE ENCOUNTER
Not all of her labs are back yet but kidney function remains stable.  The kidney ultrasound those does suggest the possibility of a lesion in the right kidney.  Have her do an MRI of the abdomen with and without contrast to better define.  Then see me for follow-up to review everything.

## 2025-03-14 LAB
ANA NUCLEOLAR TITR SER IF: 80 {TITER}
DSDNA AB TITR SER: <10 {TITER}
NUCLEAR IGG TITR SER IF: POSITIVE {TITER}

## 2025-03-15 LAB — ANTI-PLA2R: <1.8 RU/ML

## 2025-03-18 LAB
CENTROMERE IGG SER-ACNC: 0.5 U/ML
ENA JO1 AB SER IA-ACNC: 0.3 U/ML
ENA RNP IGG SER IA-ACNC: 1.6 U/ML
ENA SCL70 IGG SER IA-ACNC: <0.6 U/ML
ENA SM IGG SER IA-ACNC: <0.7 U/ML
ENA SS-A IGG SER IA-ACNC: <0.4 U/ML
ENA SS-B IGG SER IA-ACNC: 0.4 U/ML
U1 SNRNP IGG SER IA-ACNC: 1.7 U/ML

## 2025-03-21 ENCOUNTER — TELEPHONE (OUTPATIENT)
Facility: CLINIC | Age: 55
End: 2025-03-21

## 2025-03-21 NOTE — TELEPHONE ENCOUNTER
1st,overdue reminder letter mailed out to patient   Labs order   Orders Placed on 2/6/2025    Helicobacter Pylori Breath Test, Adult

## 2025-03-26 ENCOUNTER — TELEPHONE (OUTPATIENT)
Dept: NEPHROLOGY | Facility: CLINIC | Age: 55
End: 2025-03-26

## 2025-03-26 NOTE — TELEPHONE ENCOUNTER
Informed patient of note below  and last name verified pt verbalized understanding.Noted pt is scheduled MRI 0n 25 already scheduled follow up on 25.

## 2025-03-26 NOTE — TELEPHONE ENCOUNTER
----- Message from Eulogio Krause sent at 3/25/2025  1:47 PM CDT -----  Please call patient.  Not reading [employer]

## 2025-03-26 NOTE — TELEPHONE ENCOUNTER
Labs are in.  Kidney function is stable.  Please see me soon for follow-up after you complete the MRI scan.   Written by Eulogio Krause MD on 3/16/2025 11:39 AM CDT

## 2025-04-25 DIAGNOSIS — Z00.00 ANNUAL PHYSICAL EXAM: Primary | ICD-10-CM

## 2025-05-01 ENCOUNTER — LAB ENCOUNTER (OUTPATIENT)
Dept: LAB | Facility: REFERENCE LAB | Age: 55
End: 2025-05-01
Payer: COMMERCIAL

## 2025-05-01 DIAGNOSIS — Z00.00 ANNUAL PHYSICAL EXAM: ICD-10-CM

## 2025-05-01 LAB
ALBUMIN SERPL-MCNC: 3.9 G/DL (ref 3.2–4.8)
ALBUMIN/GLOB SERPL: 1.1 {RATIO} (ref 1–2)
ALP LIVER SERPL-CCNC: 85 U/L (ref 46–118)
ALT SERPL-CCNC: 14 U/L (ref 10–49)
ANION GAP SERPL CALC-SCNC: 7 MMOL/L (ref 0–18)
AST SERPL-CCNC: 18 U/L (ref ?–34)
BILIRUB SERPL-MCNC: 0.4 MG/DL (ref 0.3–1.2)
BUN BLD-MCNC: 20 MG/DL (ref 9–23)
BUN/CREAT SERPL: 12 (ref 10–20)
CALCIUM BLD-MCNC: 9.4 MG/DL (ref 8.7–10.4)
CHLORIDE SERPL-SCNC: 102 MMOL/L (ref 98–112)
CHOLEST SERPL-MCNC: 197 MG/DL (ref ?–200)
CO2 SERPL-SCNC: 29 MMOL/L (ref 21–32)
CREAT BLD-MCNC: 1.67 MG/DL (ref 0.55–1.02)
DEPRECATED RDW RBC AUTO: 43.8 FL (ref 35.1–46.3)
EGFRCR SERPLBLD CKD-EPI 2021: 36 ML/MIN/1.73M2 (ref 60–?)
ERYTHROCYTE [DISTWIDTH] IN BLOOD BY AUTOMATED COUNT: 13.2 % (ref 11–15)
FASTING PATIENT LIPID ANSWER: YES
FASTING STATUS PATIENT QL REPORTED: YES
GLOBULIN PLAS-MCNC: 3.6 G/DL (ref 2–3.5)
GLUCOSE BLD-MCNC: 83 MG/DL (ref 70–99)
HCT VFR BLD AUTO: 37.2 % (ref 35–48)
HDLC SERPL-MCNC: 41 MG/DL (ref 40–59)
HGB BLD-MCNC: 11.5 G/DL (ref 12–16)
LDLC SERPL CALC-MCNC: 144 MG/DL (ref ?–100)
MCH RBC QN AUTO: 27.9 PG (ref 26–34)
MCHC RBC AUTO-ENTMCNC: 30.9 G/DL (ref 31–37)
MCV RBC AUTO: 90.3 FL (ref 80–100)
NONHDLC SERPL-MCNC: 156 MG/DL (ref ?–130)
OSMOLALITY SERPL CALC.SUM OF ELEC: 288 MOSM/KG (ref 275–295)
PLATELET # BLD AUTO: 327 10(3)UL (ref 150–450)
POTASSIUM SERPL-SCNC: 4.5 MMOL/L (ref 3.5–5.1)
PROT SERPL-MCNC: 7.5 G/DL (ref 5.7–8.2)
RBC # BLD AUTO: 4.12 X10(6)UL (ref 3.8–5.3)
SODIUM SERPL-SCNC: 138 MMOL/L (ref 136–145)
TRIGL SERPL-MCNC: 68 MG/DL (ref 30–149)
TSI SER-ACNC: 2.89 UIU/ML (ref 0.55–4.78)
VIT B12 SERPL-MCNC: 503 PG/ML (ref 211–911)
VIT D+METAB SERPL-MCNC: 35 NG/ML (ref 30–100)
VLDLC SERPL CALC-MCNC: 13 MG/DL (ref 0–30)
WBC # BLD AUTO: 7.5 X10(3) UL (ref 4–11)

## 2025-05-01 PROCEDURE — 85027 COMPLETE CBC AUTOMATED: CPT

## 2025-05-01 PROCEDURE — 82306 VITAMIN D 25 HYDROXY: CPT

## 2025-05-01 PROCEDURE — 80053 COMPREHEN METABOLIC PANEL: CPT

## 2025-05-01 PROCEDURE — 84443 ASSAY THYROID STIM HORMONE: CPT

## 2025-05-01 PROCEDURE — 36415 COLL VENOUS BLD VENIPUNCTURE: CPT

## 2025-05-01 PROCEDURE — 80061 LIPID PANEL: CPT

## 2025-05-01 PROCEDURE — 82607 VITAMIN B-12: CPT

## 2025-05-03 ENCOUNTER — OFFICE VISIT (OUTPATIENT)
Dept: INTERNAL MEDICINE CLINIC | Facility: CLINIC | Age: 55
End: 2025-05-03
Payer: COMMERCIAL

## 2025-05-03 VITALS
DIASTOLIC BLOOD PRESSURE: 63 MMHG | TEMPERATURE: 97 F | WEIGHT: 217 LBS | BODY MASS INDEX: 37.05 KG/M2 | RESPIRATION RATE: 20 BRPM | OXYGEN SATURATION: 98 % | HEART RATE: 80 BPM | SYSTOLIC BLOOD PRESSURE: 129 MMHG | HEIGHT: 64 IN

## 2025-05-03 DIAGNOSIS — R77.8 ABNORMAL SERUM PROTEIN ELECTROPHORESIS: ICD-10-CM

## 2025-05-03 DIAGNOSIS — N18.32 STAGE 3B CHRONIC KIDNEY DISEASE (HCC): Primary | ICD-10-CM

## 2025-05-03 DIAGNOSIS — I10 ESSENTIAL HYPERTENSION: ICD-10-CM

## 2025-05-03 PROCEDURE — 3008F BODY MASS INDEX DOCD: CPT | Performed by: NURSE PRACTITIONER

## 2025-05-03 PROCEDURE — 3078F DIAST BP <80 MM HG: CPT | Performed by: NURSE PRACTITIONER

## 2025-05-03 PROCEDURE — 99214 OFFICE O/P EST MOD 30 MIN: CPT | Performed by: NURSE PRACTITIONER

## 2025-05-03 PROCEDURE — 3074F SYST BP LT 130 MM HG: CPT | Performed by: NURSE PRACTITIONER

## 2025-05-03 RX ORDER — AMLODIPINE BESYLATE 5 MG/1
5 TABLET ORAL DAILY
COMMUNITY
Start: 2025-04-29 | End: 2025-05-03

## 2025-05-03 RX ORDER — AMLODIPINE BESYLATE 10 MG/1
5 TABLET ORAL DAILY
Qty: 90 TABLET | Refills: 2 | Status: SHIPPED | OUTPATIENT
Start: 2025-05-03

## 2025-05-03 NOTE — PROGRESS NOTES
HPI:    Patient ID: Christine Francis is a 54 year old female.    HPI Follow up HTN, CKD  54 year old female with Hx of HTN, Chronic Kidney disease stage III.  She is being followed by Hematology for possible MGUS.  She is feeling well with no complaints except increased anxiety.    HTN  Monitors blood pressure at home.and is stable.  TODAY  Blood pressure 129/63, pulse 80, temperature 97.4 °F (36.3 °C), temperature source Temporal, resp. rate 20, height 5' 4\" (1.626 m), weight 217 lb (98.4 kg), SpO2 98%, not currently breastfeeding.     She is following with Nephrologist and  recent labs were completed.  CBC  Renal Painel  Urinalysis  Urine for microalbumin-High  Urine for Bence-Kim protein +  Sed Rate  Renal Ultrasound showing a renal lesion- Scheduled forMRI  Immunization History   Administered Date(s) Administered    Covid-19 Vaccine Moderna 100 mcg/0.5 ml 01/09/2021, 02/06/2021, 11/11/2021, 06/04/2022    Covid-19 Vaccine Moderna 50 Mcg/0.25 Ml 06/04/2022    Influenza 09/13/2017, 09/02/2018    Influenza Vaccine, trivalent (IIV3), PF 0.5mL (58191) 12/24/2024    TDAP 08/26/2020       Past Medical History[1]   Past Surgical History[2]   Social Hx on file[3]       Review of Systems   Constitutional:  Negative for chills, fatigue and fever.   HENT:  Negative for ear pain, hearing loss, sinus pain, sore throat and trouble swallowing.    Eyes:  Negative for pain and visual disturbance.   Respiratory:  Negative for cough, chest tightness and shortness of breath.    Cardiovascular:  Negative for chest pain, palpitations and leg swelling.   Gastrointestinal:  Negative for abdominal pain, constipation, diarrhea, nausea and vomiting.   Endocrine: Negative for cold intolerance and heat intolerance.   Genitourinary:  Negative for dysuria and hematuria.   Musculoskeletal:  Negative for back pain and joint swelling.   Skin:  Negative for rash.   Allergic/Immunologic: Negative for environmental allergies.    Neurological:  Negative for weakness, numbness and headaches.   Hematological:  Does not bruise/bleed easily.   Psychiatric/Behavioral:  Negative for dysphoric mood and sleep disturbance. The patient is not nervous/anxious.             Current Medications[4]  Allergies:Allergies[5]   PHYSICAL EXAM:   Physical Exam  Constitutional:       Appearance: Normal appearance.   HENT:      Head: Normocephalic.      Mouth/Throat:      Mouth: Mucous membranes are moist.   Cardiovascular:      Rate and Rhythm: Normal rate and regular rhythm.   Pulmonary:      Effort: Pulmonary effort is normal.   Abdominal:      General: Abdomen is flat.   Musculoskeletal:         General: Normal range of motion.      Cervical back: Normal range of motion.   Skin:     General: Skin is dry.   Neurological:      Mental Status: She is alert and oriented to person, place, and time.   Psychiatric:         Mood and Affect: Mood normal.         Behavior: Behavior normal.         Thought Content: Thought content normal.         Judgment: Judgment normal.       /63   Pulse 80   Temp 97.4 °F (36.3 °C) (Temporal)   Resp 20   Ht 5' 4\" (1.626 m)   Wt 217 lb (98.4 kg)   SpO2 98%   BMI 37.25 kg/m²   Wt Readings from Last 2 Encounters:   05/03/25 217 lb (98.4 kg)   02/28/25 222 lb (100.7 kg)     Body mass index is 37.25 kg/m².(2)  Lab Results   Component Value Date    WBC 7.5 05/01/2025    RBC 4.12 05/01/2025    HGB 11.5 (L) 05/01/2025    HCT 37.2 05/01/2025    MCV 90.3 05/01/2025    MCH 27.9 05/01/2025    MCHC 30.9 (L) 05/01/2025    RDW 13.2 05/01/2025    .0 05/01/2025    MPV 8.6 01/09/2019      Lab Results   Component Value Date    GLU 83 05/01/2025    BUN 20 05/01/2025    BUNCREA 12.0 05/01/2025    CREATSERUM 1.67 (H) 05/01/2025    ANIONGAP 7 05/01/2025    GFRNAA 66 11/24/2021    GFRAA 76 11/24/2021    CA 9.4 05/01/2025    OSMOCALC 288 05/01/2025    ALKPHO 85 05/01/2025    AST 18 05/01/2025    ALT 14 05/01/2025    ALKPHOS 120 (H)  2016    BILT 0.4 2025    TP 7.5 2025    ALB 3.9 2025    GLOBULIN 3.6 (H) 2025    AGRATIO 0.8 (L) 2015     2025    K 4.5 2025     2025    CO2 29.0 2025      Lab Results   Component Value Date     2023    A1C 5.7 (H) 2023      Lab Results   Component Value Date    CHOLEST 197 2025    TRIG 68 2025    HDL 41 2025     (H) 2025    VLDL 13 2025    NONHDLC 156 (H) 2025    CALCNONHDL 135 (H) 2015      Lab Results   Component Value Date    TSH 2.888 2025                ASSESSMENT/PLAN:     Assessment & Plan  Stage 3b chronic kidney disease (HCC)  Stage III Kidney Disease    Following with Nephrologist   Increased Microalbumin  +Bence-Kim Protein    Plan  Following with Nephrologist and Hematologist         Abnormal serum protein electrophoresis  Following with Hematology/Oncology         Essential hypertension  Blood pressure 129/63, pulse 80, temperature 97.4 °F (36.3 °C), temperature source Temporal, resp. rate 20, height 5' 4\" (1.626 m), weight 217 lb (98.4 kg), SpO2 98%, not currently breastfeeding.       Plan  Continue Amlodipine 10mg po daily             No orders of the defined types were placed in this encounter.      Meds This Visit:  Requested Prescriptions     Signed Prescriptions Disp Refills    amLODIPine 10 MG Oral Tab 90 tablet 2     Sig: Take 0.5 tablets (5 mg total) by mouth daily.       Imaging & Referrals:  None         XIAO Mendiola          [1]   Past Medical History:   Essential hypertension    Fibroids    surgery   [2]   Past Surgical History:  Procedure Laterality Date          Colonoscopy N/A 2021    Procedure: COLONOSCOPY;  Surgeon: Kelechi Sanabria MD;  Location: [facility] ENDOSCOPY    Other surgical history  2015    Root Canal Extraction   [3]   Social History  Socioeconomic History    Marital status: Legally    Tobacco  Use    Smoking status: Never    Smokeless tobacco: Never   Vaping Use    Vaping status: Never Used   Substance and Sexual Activity    Alcohol use: Not Currently     Comment: very rare     Drug use: No    Sexual activity: Never     Partners: Male   Other Topics Concern    Caffeine Concern Yes     Comment: coffee, 2 cups/day   [4]   Current Outpatient Medications   Medication Sig Dispense Refill    amLODIPine 10 MG Oral Tab Take 0.5 tablets (5 mg total) by mouth daily. 90 tablet 2    ALPRAZolam 0.5 MG Oral Tab Take 1 tablet (0.5 mg total) by mouth one time.      PEG 3350-KCl-Na Bicarb-NaCl (TRILYTE) 420 g Oral Recon Soln Take prep as directed by gastro office. May substitute with Trilyte/generic equivalent if needed. (Patient not taking: Reported on 2/28/2025) 4000 mL 0    fluticasone propionate 50 MCG/ACT Nasal Suspension 2 sprays by Nasal route daily. 16 g 0    mupirocin 2 % External Ointment Apply 1 Application topically 3 (three) times daily. 15 g 1    omeprazole 20 MG Oral Capsule Delayed Release Take 1 capsule (20 mg total) by mouth every morning. 60 capsule 5    clotrimazole-betamethasone 1-0.05 % External Cream Apply 1 Application  topically 2 (two) times daily as needed. 45 g 3    Vitamin D3 50 MCG (2000 UT) Oral Cap Take 1 capsule (2,000 Units total) by mouth daily.     [5] No Known Allergies

## 2025-05-04 NOTE — ASSESSMENT & PLAN NOTE
Stage III Kidney Disease    Following with Nephrologist   Increased Microalbumin  +Bence-Kim Protein    Plan  Following with Nephrologist and Hematologist

## 2025-05-04 NOTE — ASSESSMENT & PLAN NOTE
Blood pressure 129/63, pulse 80, temperature 97.4 °F (36.3 °C), temperature source Temporal, resp. rate 20, height 5' 4\" (1.626 m), weight 217 lb (98.4 kg), SpO2 98%, not currently breastfeeding.       Plan  Continue Amlodipine 10mg po daily

## 2025-05-25 ENCOUNTER — HOSPITAL ENCOUNTER (OUTPATIENT)
Dept: MRI IMAGING | Facility: HOSPITAL | Age: 55
Discharge: HOME | End: 2025-05-25
Attending: INTERNAL MEDICINE
Payer: COMMERCIAL

## 2025-05-25 ENCOUNTER — HOSPITAL ENCOUNTER (OUTPATIENT)
Dept: MRI IMAGING | Facility: HOSPITAL | Age: 55
End: 2025-05-25
Attending: INTERNAL MEDICINE
Payer: COMMERCIAL

## 2025-05-25 PROCEDURE — A9575 INJ GADOTERATE MEGLUMI 0.1ML: HCPCS | Performed by: INTERNAL MEDICINE

## 2025-05-25 PROCEDURE — 74183 MRI ABD W/O CNTR FLWD CNTR: CPT | Performed by: INTERNAL MEDICINE

## 2025-05-25 RX ORDER — GADOTERATE MEGLUMINE 376.9 MG/ML
20 INJECTION INTRAVENOUS
Status: COMPLETED | OUTPATIENT
Start: 2025-05-25 | End: 2025-05-25

## 2025-05-25 RX ADMIN — GADOTERATE MEGLUMINE 20 ML: 376.9 INJECTION INTRAVENOUS at 09:30:00

## 2025-06-01 ENCOUNTER — PATIENT MESSAGE (OUTPATIENT)
Dept: GASTROENTEROLOGY | Facility: CLINIC | Age: 55
End: 2025-06-01

## 2025-06-06 ENCOUNTER — OFFICE VISIT (OUTPATIENT)
Dept: NEPHROLOGY | Facility: CLINIC | Age: 55
End: 2025-06-06
Payer: COMMERCIAL

## 2025-06-06 VITALS
SYSTOLIC BLOOD PRESSURE: 150 MMHG | BODY MASS INDEX: 36.88 KG/M2 | HEIGHT: 64 IN | DIASTOLIC BLOOD PRESSURE: 79 MMHG | HEART RATE: 84 BPM | WEIGHT: 216 LBS

## 2025-06-06 DIAGNOSIS — N18.32 STAGE 3B CHRONIC KIDNEY DISEASE (HCC): Primary | ICD-10-CM

## 2025-06-06 PROCEDURE — 3008F BODY MASS INDEX DOCD: CPT | Performed by: INTERNAL MEDICINE

## 2025-06-06 PROCEDURE — 99213 OFFICE O/P EST LOW 20 MIN: CPT | Performed by: INTERNAL MEDICINE

## 2025-06-06 PROCEDURE — 3078F DIAST BP <80 MM HG: CPT | Performed by: INTERNAL MEDICINE

## 2025-06-06 PROCEDURE — 3077F SYST BP >= 140 MM HG: CPT | Performed by: INTERNAL MEDICINE

## 2025-06-06 RX ORDER — MULTIVITAMIN
1 TABLET ORAL DAILY
COMMUNITY

## 2025-06-06 NOTE — PROGRESS NOTES
06/06/25        Patient: Christine Francis   YOB: 1970   Date of Visit: 6/6/2025       Dear  Dr. Maddi MD,      Thank you for referring Christine Francis to my practice.  Please find my assessment and plan below.      As you know she is a 54-year-old female with a history of hypertension and possibly MGUS followed by hematology who I now had the pleasure of seeing for follow-up of chronic kidney disease stage III.  The patient just underwent a recent renal evaluation.  Her GAVI was borderline at 1-80.  Other serological tests though were nonrevealing making this a false positive GAVI.  Urinalysis was unremarkable except for mild proteinuria with a urine microalbumin to creatinine ratio 318.  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  Her repeat creatinine on March 10, 2025 was stable at 1.50 with an estimated GFR of 41 cc/min.  However renal ultrasound done on March 12, 2025 suggested the possibility of a 1.9 cm renal mass in the right kidney versus a parenchymal lobulation.  As result an MRI of the abdomen was done on May 25, 2025.  This revealed fetal lobulations but otherwise no suspicious masses were seen.    Finally she had a follow-up blood test done on May 1, 2025 ordered by Bess Farah.  For some reason her creatinine though it crept up to 1.67 now with an estimated GFR 36 cc/min.  The patient states she may have been sick at that time.    I therefore informed the patient that she does have chronic kidney disease stage III most likely secondary to hypertension and nephrosclerosis.  She checks her blood pressures regularly at home and they are usually in the 130 over 70s range.  Her blood pressure today here in the office 150/79.  Repeat was 142/80.  She will continue to monitor blood pressures regularly.  Reinforced importance of maintaining adequate hydration.  Avoid nonsteroidals.  Will have her repeat a CBC and renal panel now at to see if that increase  in her creatinine persists.  Otherwise if doing well we will have her continue to do CBC and renal panels quarterly.  I will see her again in 6 months for follow-up or sooner if clinically indicated.  Follow-up with hematology as they advise.    Thank you again for allowing me to participate in the care of your patient.  If you have any questions please feel free to call.           Sincerely,   Eulogio Krause MD   [facility], [facility], [place]  [address]  [place] [facility] [address]    Document electronically generated by:  Eulogio Krause MD

## 2025-06-23 ENCOUNTER — NURSE ONLY (OUTPATIENT)
Dept: LAB | Age: 55
End: 2025-06-23
Attending: NURSE PRACTITIONER
Payer: COMMERCIAL

## 2025-06-23 DIAGNOSIS — K21.9 GASTROESOPHAGEAL REFLUX DISEASE, UNSPECIFIED WHETHER ESOPHAGITIS PRESENT: ICD-10-CM

## 2025-06-23 DIAGNOSIS — D50.9 IRON DEFICIENCY ANEMIA, UNSPECIFIED IRON DEFICIENCY ANEMIA TYPE: ICD-10-CM

## 2025-06-23 PROCEDURE — 83013 H PYLORI (C-13) BREATH: CPT

## 2025-06-24 RX ORDER — AMLODIPINE BESYLATE 10 MG/1
5 TABLET ORAL DAILY
Qty: 90 TABLET | Refills: 2 | Status: CANCELLED | OUTPATIENT
Start: 2025-06-24

## 2025-06-25 LAB — H PYLORI BREATH TEST: NEGATIVE

## 2025-06-27 ENCOUNTER — TELEPHONE (OUTPATIENT)
Facility: CLINIC | Age: 55
End: 2025-06-27

## 2025-06-27 RX ORDER — AMLODIPINE BESYLATE 10 MG/1
10 TABLET ORAL DAILY
Qty: 90 TABLET | Refills: 2 | OUTPATIENT
Start: 2025-06-27

## 2025-06-27 NOTE — TELEPHONE ENCOUNTER
Patient was called to confirm upcoming procedure.  Confirmed location, date, medications, prep  and insurance.    Patient is aware to hold VITAMINS AND SUPPLEMENTS FOR 2 WEEKS .PRIOR TO PROCEDURE     Patient had prep questions, which were addressed appropriately. NONE , SHE PICKED UP PREP    Patient verbally understood.

## 2025-06-30 RX ORDER — CETIRIZINE HYDROCHLORIDE 10 MG/1
10 TABLET ORAL DAILY
COMMUNITY

## 2025-07-07 ENCOUNTER — HOSPITAL ENCOUNTER (OUTPATIENT)
Facility: HOSPITAL | Age: 55
Setting detail: HOSPITAL OUTPATIENT SURGERY
Discharge: HOME | End: 2025-07-07
Attending: INTERNAL MEDICINE | Admitting: INTERNAL MEDICINE
Payer: COMMERCIAL

## 2025-07-07 ENCOUNTER — ANESTHESIA (OUTPATIENT)
Dept: ENDOSCOPY | Facility: HOSPITAL | Age: 55
End: 2025-07-07
Payer: COMMERCIAL

## 2025-07-07 ENCOUNTER — ANESTHESIA EVENT (OUTPATIENT)
Dept: ENDOSCOPY | Facility: HOSPITAL | Age: 55
End: 2025-07-07
Payer: COMMERCIAL

## 2025-07-07 VITALS
DIASTOLIC BLOOD PRESSURE: 69 MMHG | OXYGEN SATURATION: 95 % | TEMPERATURE: 98 F | HEART RATE: 68 BPM | HEIGHT: 64 IN | SYSTOLIC BLOOD PRESSURE: 116 MMHG | BODY MASS INDEX: 37.56 KG/M2 | RESPIRATION RATE: 18 BRPM | WEIGHT: 220 LBS

## 2025-07-07 DIAGNOSIS — K21.9 GASTROESOPHAGEAL REFLUX DISEASE, UNSPECIFIED WHETHER ESOPHAGITIS PRESENT: ICD-10-CM

## 2025-07-07 DIAGNOSIS — D50.9 IRON DEFICIENCY ANEMIA, UNSPECIFIED IRON DEFICIENCY ANEMIA TYPE: ICD-10-CM

## 2025-07-07 DIAGNOSIS — Z12.11 SPECIAL SCREENING FOR MALIGNANT NEOPLASMS, COLON: ICD-10-CM

## 2025-07-07 PROBLEM — K31.9 GASTRIC ERYTHEMA: Status: ACTIVE | Noted: 2025-07-07

## 2025-07-07 PROBLEM — K64.9 HEMORRHOIDS: Status: ACTIVE | Noted: 2025-07-07

## 2025-07-07 PROCEDURE — 45378 DIAGNOSTIC COLONOSCOPY: CPT | Performed by: INTERNAL MEDICINE

## 2025-07-07 PROCEDURE — 43239 EGD BIOPSY SINGLE/MULTIPLE: CPT | Performed by: INTERNAL MEDICINE

## 2025-07-07 RX ORDER — MIDAZOLAM HYDROCHLORIDE 1 MG/ML
INJECTION INTRAMUSCULAR; INTRAVENOUS AS NEEDED
Status: DISCONTINUED | OUTPATIENT
Start: 2025-07-07 | End: 2025-07-07 | Stop reason: SURG

## 2025-07-07 RX ORDER — NALOXONE HYDROCHLORIDE 0.4 MG/ML
0.08 INJECTION, SOLUTION INTRAMUSCULAR; INTRAVENOUS; SUBCUTANEOUS ONCE AS NEEDED
Status: DISCONTINUED | OUTPATIENT
Start: 2025-07-07 | End: 2025-07-07 | Stop reason: HOSPADM

## 2025-07-07 RX ORDER — SODIUM CHLORIDE, SODIUM LACTATE, POTASSIUM CHLORIDE, CALCIUM CHLORIDE 600; 310; 30; 20 MG/100ML; MG/100ML; MG/100ML; MG/100ML
INJECTION, SOLUTION INTRAVENOUS CONTINUOUS
Status: DISCONTINUED | OUTPATIENT
Start: 2025-07-07 | End: 2025-07-07

## 2025-07-07 RX ORDER — GLYCOPYRROLATE 0.2 MG/ML
INJECTION, SOLUTION INTRAMUSCULAR; INTRAVENOUS AS NEEDED
Status: DISCONTINUED | OUTPATIENT
Start: 2025-07-07 | End: 2025-07-07 | Stop reason: SURG

## 2025-07-07 RX ADMIN — MIDAZOLAM HYDROCHLORIDE 2 MG: 1 INJECTION INTRAMUSCULAR; INTRAVENOUS at 08:31:00

## 2025-07-07 RX ADMIN — GLYCOPYRROLATE 0.4 MG: 0.2 INJECTION, SOLUTION INTRAMUSCULAR; INTRAVENOUS at 09:08:00

## 2025-07-07 NOTE — ANESTHESIA PROCEDURE NOTES
Airway  Date/Time: 7/7/2025 8:45 AM  Airway not difficult    General Information and Staff   Patient location during procedure: endo  Anesthesiologist: Doug Hopkins MD  Performed: anesthesiologist   Performed by: Doug Hopkins MD  Authorized by: Doug Hopkins MD        Indications and Patient Condition  Indications for airway management: airway protection and respiratory distress  Sedation level: deep     Preoxygenated: noPatient position: sniffing    Mask difficulty assessment: 1 - vent by mask    Final Airway Details    Final airway type: supraglottic airway      Successful airway:   Size: 4     Number of attempts at approach: 1  Number of other approaches attempted: 0    Additional Comments  Patient had severe laryngospasm due to oral secretions against vocal cords. Ambu bag was used to reoxygenate and procedure was reattempted but the laryngospasm repeated.  LMA was placed and patient returned to spontaneous ventilation.

## 2025-07-07 NOTE — DISCHARGE INSTRUCTIONS
Home Care Instructions for Colonoscopy and/or Gastroscopy with Sedation    Diet:  - Resume your regular diet as tolerated unless otherwise instructed.  - Start with light meals to minimize bloating.  - Do not drink alcohol today.    Medication:  - If you have questions about resuming your normal medications, please contact your Primary Care Physician.    Activities:  - Take it easy today. Do not return to work today.  - Do not drive today.  - Do not operate any machinery today (including kitchen equipment).    Colonoscopy:  - You may notice some rectal \"spotting\" (a little blood on the toilet tissue) for a day or two after the exam. This is normal.  - If you experience any rectal bleeding (not spotting), persistent tenderness or sharp severe abdominal pains, oral temperature over 100 degrees Fahrenheit, light-headedness or dizziness, or any other problems, contact your doctor.    Gastroscopy:  - You may have a sore throat for 2-3 days following the exam. This is normal. Gargling with warm salt water (1/2 tsp salt to 1 glass warm water) or using throat lozenges will help.  - If you experience any sharp pain in your neck, abdomen or chest, vomiting of blood, oral temperature over 100 degrees Fahrenheit, light-headedness or dizziness, or any other problems, contact your doctor.    **If unable to reach your doctor, please go to the [facility] Emergency Room**    - Your referring physician will receive a full report of your examination.  - If you do not hear from your doctor's office within two weeks of your biopsy, please call them for your results.    You may be able to see your laboratory results in [facility] between 4 and 7 business days.  In some cases, your physician may not have viewed the results before they are released to [facility].  If you have questions regarding your results contact the physician who ordered the test/exam by phone or via [facility] by choosing \"Ask a Medical Question.\"

## 2025-07-07 NOTE — H&P
Pre Procedure History & Physical Examination    Patient Name: Christine Francis  MRN: [id number]  CSN: [id number]  YOB: 1970    Diagnosis: #gerd  #crcs creening  #ivis     Prescriptions Prior to Admission[1]  Current Hospital Medications[2]    Allergies: Allergies[3]    Past Medical History[4]  Past Surgical History[5]  Family History[6]  Social History     Tobacco Use    Smoking status: Never    Smokeless tobacco: Never   Substance Use Topics    Alcohol use: Yes     Comment: very rare          ROS:   CONSTITUTIONAL:  negative for fevers, rigors  EYES:  negative for diplopia   RESPIRATORY:  negative for severe shortness of breath  CARDIOVASCULAR:  negative for crushing sub-sternal chest pain  GASTROINTESTINAL:  see HPI  GENITOURINARY:  negative for dysuria or gross hematuria  INTEGUMENT/BREAST:  SKIN:  negative for jaundice   ALLERGIC/IMMUNOLOGIC:  negative for hay fever  ENDOCRINE:  negative for cold intolerance and heat intolerance  MUSCULOSKELETAL:  negative for joint effusion/severe erythema  BEHAVIOR/PSYCH:  negative for psychotic behavior      PHYSICAL EXAM:   /74 (BP Location: Left arm)   Pulse 74   Resp 14   Ht 5' 4\" (1.626 m)   Wt 220 lb (99.8 kg)   LMP 10/28/2020   SpO2 96%   BMI 37.76 kg/m²       Gen: Patient appears comfortable and in no acute discomfort  HEENT: the sclera appears anicteric, oropharynx clear, mucus membranes appear moist  CV: regular rate and rhythm, the extremities are warm and well perfused   Lung: Moves air well; No labored breathing  Abdomen: soft, non-tender exam in all quadrants without rigidity or guarding, non-distended, no abnormal bowel sounds noted, no masses are palpated  Skin: No jaundice  Ext: no cyanosis, clubbing or edema is evident.   Neuro: Alert and interactive, and gross movements of extremities normal    I have discussed the risks and benefits and alternatives of the procedure with the patient/family.  They understand and agree  to proceed with plan of care.   I have reviewed recent H&P/clinic notes  Kelechi Sanabria MD  [facility] - Gastroenterology  7/7/2025  8:23 AM         [1]   Medications Prior to Admission   Medication Sig Dispense Refill Last Dose/Taking    cetirizine 10 MG Oral Tab Take 1 tablet (10 mg total) by mouth daily.   Taking    omeprazole 20 MG Oral Capsule Delayed Release Take 1 capsule (20 mg total) by mouth every morning. 60 capsule 5 7/1/2025    Multiple Vitamin (ONE-DAILY MULTI VITAMINS) Oral Tab Take 1 tablet by mouth daily. (Patient not taking: Reported on 6/30/2025)   Not Taking    amLODIPine 10 MG Oral Tab Take 0.5 tablets (5 mg total) by mouth daily. (Patient taking differently: Take 1 tablet (10 mg total) by mouth daily.) 90 tablet 2 7/7/2025 Morning    ALPRAZolam 0.5 MG Oral Tab Take 1 tablet (0.5 mg total) by mouth one time. (Patient not taking: Reported on 6/6/2025)       PEG 3350-KCl-Na Bicarb-NaCl (TRILYTE) 420 g Oral Recon Soln Take prep as directed by gastro office. May substitute with Trilyte/generic equivalent if needed. (Patient not taking: Reported on 6/6/2025) 4000 mL 0     fluticasone propionate 50 MCG/ACT Nasal Suspension 2 sprays by Nasal route daily. (Patient not taking: Reported on 6/6/2025) 16 g 0     mupirocin 2 % External Ointment Apply 1 Application topically 3 (three) times daily. (Patient not taking: Reported on 6/6/2025) 15 g 1     clotrimazole-betamethasone 1-0.05 % External Cream Apply 1 Application  topically 2 (two) times daily as needed. (Patient not taking: Reported on 6/30/2025) 45 g 3 Not Taking    Vitamin D3 50 MCG (2000 UT) Oral Cap Take 1 capsule (2,000 Units total) by mouth daily. (Patient not taking: Reported on 6/6/2025)      [2]   Current Facility-Administered Medications   Medication Dose Route Frequency    lactated ringers infusion   Intravenous Continuous   [3] No Known Allergies  [4]   Past Medical History:   Anesthesia complication    Hard to wake up after Anesthesia     Arrhythmia    Cancer (HCC)    Recently diagnosed per patient    Esophageal reflux    Essential hypertension    Fibroids    surgery    High blood pressure    Renal disorder    CKD stage 3B   [5]   Past Surgical History:  Procedure Laterality Date          Colonoscopy N/A 2021    Procedure: COLONOSCOPY;  Surgeon: Kelechi Sanabria MD;  Location: [facility] ENDOSCOPY    Other surgical history  2015    Root Canal Extraction   [6]   Family History  Problem Relation Age of Onset    Pulmonary Disease Mother         COPD    Cancer Mother 68        cervix    Hypertension Maternal Grandmother     Breast Cancer Neg     Ovarian Cancer Neg

## 2025-07-07 NOTE — OPERATIVE REPORT
ESOPHAGOGASTRODUODENOSCOPY (EGD) & COLONOSCOPY REPORT    Christine Francis     1970 Age 54 year old   PCP Terell Linda MD Endoscopist Kelechi Sanabria MD     Date of procedure: 25    Procedure: EGD w/biopsies & Colonoscopy     Pre-operative diagnosis: anemia, acid reflux, hx of colon polyps    Post-operative diagnosis: poor prep in areas, difficult sedation with laryngospasm and bleeding epiglottis after suction, gastric erythema, small hiatal hernia    Medications: MAC sedation    Withdrawal time: 18 minutes    Complications: none    Procedure: Informed consent was obtained from the patient after the risks of the procedure were discussed, including but not limited to bleeding, perforation, aspiration, infection, or possibility of a missed lesion. We discussed the risks/benefits and alternatives to this procedure, as well as the planned sedation. EGD procedure: The patient was placed in the left lateral decubitus position and begun on continuous blood pressure pulse oximetry and EKG monitoring and this was maintained throughout the procedure. Once an adequate level of sedation was obtained a bite block was placed. Then the lubricated tip of the [id number] diagnostic video upper endoscope was inserted and advanced using direct visualization into the posterior pharynx and ultimately into the esophagus. Colonoscopy procedure: Once an adequate level of sedation was obtained a digital rectal exam was completed. Then the lubricated tip of the [id number] diagnostic video colonoscope was inserted and advanced without difficulty to the cecum using the CO2 insufflation technique. The cecum was identified by localizing the trifold, the appendix and the ileocecal valve. A routine second examination of the cecum/ascending colon was performed. Withdrawal was begun with thorough washing and careful examination of the colonic walls and folds. Photodocumentation was obtained. The bowel prep was  poor. Views of the colon were fair in areas and poor in areas with washing. I then carefully withdrew the instrument from the patient who tolerated the procedure well.     Complications: During the EGD patient desaturated x 2 so had to abort procedure. Anesthesia put in LMA and proceed to colonoscopy. Completed colonoscopy and when patient stable did EGD later. She had a lot of secretion, obstruction, large tongue    EGD findings:      1. Esophagus: The squamocolumnar junction was noted at 38 cm and appeared regular. The GE junction was noted at 38 cm from the incisors. Small sliding hiatal hernia 1-2 cm. The esophageal mucosa appeared otherwise normal. There was no evidence of esophagitis, stricture or endoscopic evidence of Melton's esophagus.  2. Stomach: The stomach distended normally. Normal rugal folds were seen. The pylorus was patent. The gastric mucosa appeared erythematous so biopsied to rule out Hpylori. Retroflexion revealed a normal fundus and a normal cardia.   3. Duodenum: The duodenal mucosa appeared normal in the 1st and 2nd portion of the duodenum. Biopsied to rule out celiac    Colonoscopy findings:    1. MILIND: normal rectal tone, no masses palpated.   2. No polyp(s) noted but poor prep  3. Terminal ileum: the visualized mucosa appeared normal.  4. The colonic mucosa throughout the colon showed normal vascular pattern, without evidence of angioectasias or inflammation.   5. Diverticulosis: none noted  6. A retroflexed view of the rectum revealed hemorrhoids.    Recommend:  Repeat colonoscopy in 1 years. If new signs or symptoms develop, colonoscopy may need to be repeated sooner.   High fiber diet.  Monitor for blood in the stool. If having more than just tinge of blood, call office or go to the ER.    >>>If tissue was obtained and you have not received your pathology results either by phone or letter within 2 weeks, please call our office at [phone].    Specimens: gastric and duodenal  biopsies

## 2025-07-07 NOTE — ANESTHESIA PREPROCEDURE EVALUATION
Anesthesia PreOp Note    HPI:     Christine Francis is a 54 year old female who presents for preoperative consultation requested by: Kelechi Sanabria MD    Date of Surgery: 7/7/2025    Procedure(s):  COLONOSCOPY/ ESOPHAGOGASTRODUODENOSCOPY  ESOPHAGOGASTRODUODENOSCOPY (EGD)  Indication: Gastroesophageal reflux disease, unspecified whether esophagitis present / Special screening for malignant neoplasms, colon/ Iron deficiency anemia, unspecified iron deficiency anemia type    Relevant Problems   No relevant active problems       NPO:  Last Liquid Consumption Date: 07/07/25  Last Liquid Consumption Time: 0200  Last Solid Consumption Date: 07/06/25  Last Solid Consumption Time: 1200  Last Liquid Consumption Date: 07/07/25          History Review:  Patient Active Problem List    Diagnosis Date Noted    Stage 3b chronic kidney disease (HCC) 02/20/2025    Abnormal serum protein electrophoresis 01/14/2025    Fever 02/15/2024    Hematoma of left knee region 04/17/2023    Subacute maxillary sinusitis 01/05/2023    Rash 11/29/2022    Viral syndrome 03/22/2022    UTI symptoms 03/22/2022    Annual physical exam 08/31/2021    Vitamin D deficiency 08/03/2018    Obesity 08/10/2015    Amenorrhea 08/10/2015    Essential hypertension 01/08/2014       Past Medical History[1]    Past Surgical History[2]    Prescriptions Prior to Admission[3]  Current Medications and Prescriptions Ordered in Epic[4]    Allergies[5]    Family History[6]  Social Hx on file[7]    Available pre-op labs reviewed.  Lab Results   Component Value Date    WBC 7.5 05/01/2025    RBC 4.12 05/01/2025    HGB 11.5 (L) 05/01/2025    HCT 37.2 05/01/2025    MCV 90.3 05/01/2025    MCH 27.9 05/01/2025    MCHC 30.9 (L) 05/01/2025    RDW 13.2 05/01/2025    .0 05/01/2025     Lab Results   Component Value Date     05/01/2025    K 4.5 05/01/2025     05/01/2025    CO2 29.0 05/01/2025    BUN 20 05/01/2025    CREATSERUM 1.67 (H) 05/01/2025    GLU 83  2025    CA 9.4 2025          Vital Signs:  Body mass index is 37.76 kg/m².   height is 1.626 m (5' 4\") and weight is 99.8 kg (220 lb). Her blood pressure is 143/74 and her pulse is 74. Her respiration is 14 and oxygen saturation is 96%.   Vitals:    25 1601 25 0731   BP:  143/74   Pulse:  74   Resp:  14   SpO2:  96%   Weight: 99.8 kg (220 lb)    Height: 1.626 m (5' 4\")         Anesthesia Evaluation     Patient summary reviewed and Nursing notes reviewed    History of anesthetic complications   Airway   Mallampati: II  TM distance: >3 FB  Neck ROM: full  Dental - Dentition appears grossly intact     Pulmonary - negative ROS and normal exam   Cardiovascular - negative ROS and normal exam  Exercise tolerance: good  (+) hypertension    Neuro/Psych - negative ROS     GI/Hepatic/Renal - negative ROS   (+) GERD, bowel prep    Endo/Other - negative ROS   Abdominal  - normal exam                 Anesthesia Plan:   ASA:  2  Plan:   MAC  Informed Consent Plan and Risks Discussed With:  Patient      I have informed Christine Bobalexis Chacon Francis and/or legal guardian or family member of the nature of the anesthetic plan, benefits, risks including possible dental damage if relevant, major complications, and any alternative forms of anesthetic management.   All of the patient's questions were answered to the best of my ability. The patient desires the anesthetic management as planned.  Doug Hopkins MD  2025 8:19 AM  Present on Admission:  **None**           [1]   Past Medical History:   Anesthesia complication    Hard to wake up after Anesthesia    Arrhythmia    Cancer (HCC)    Recently diagnosed per patient    Esophageal reflux    Essential hypertension    Fibroids    surgery    High blood pressure    Renal disorder    CKD stage 3B   [2]   Past Surgical History:  Procedure Laterality Date          Colonoscopy N/A 2021    Procedure: COLONOSCOPY;  Surgeon: Kelechi Sanabria MD;   Location: [facility] ENDOSCOPY    Other surgical history  February 2015    Root Canal Extraction   [3]   Medications Prior to Admission   Medication Sig Dispense Refill Last Dose/Taking    cetirizine 10 MG Oral Tab Take 1 tablet (10 mg total) by mouth daily.   Taking    omeprazole 20 MG Oral Capsule Delayed Release Take 1 capsule (20 mg total) by mouth every morning. 60 capsule 5 7/1/2025    Multiple Vitamin (ONE-DAILY MULTI VITAMINS) Oral Tab Take 1 tablet by mouth daily. (Patient not taking: Reported on 6/30/2025)   Not Taking    amLODIPine 10 MG Oral Tab Take 0.5 tablets (5 mg total) by mouth daily. (Patient taking differently: Take 1 tablet (10 mg total) by mouth daily.) 90 tablet 2 7/7/2025 Morning    ALPRAZolam 0.5 MG Oral Tab Take 1 tablet (0.5 mg total) by mouth one time. (Patient not taking: Reported on 6/6/2025)       PEG 3350-KCl-Na Bicarb-NaCl (TRILYTE) 420 g Oral Recon Soln Take prep as directed by gastro office. May substitute with Trilyte/generic equivalent if needed. (Patient not taking: Reported on 6/6/2025) 4000 mL 0     fluticasone propionate 50 MCG/ACT Nasal Suspension 2 sprays by Nasal route daily. (Patient not taking: Reported on 6/6/2025) 16 g 0     mupirocin 2 % External Ointment Apply 1 Application topically 3 (three) times daily. (Patient not taking: Reported on 6/6/2025) 15 g 1     clotrimazole-betamethasone 1-0.05 % External Cream Apply 1 Application  topically 2 (two) times daily as needed. (Patient not taking: Reported on 6/30/2025) 45 g 3 Not Taking    Vitamin D3 50 MCG (2000 UT) Oral Cap Take 1 capsule (2,000 Units total) by mouth daily. (Patient not taking: Reported on 6/6/2025)      [4]   Current Facility-Administered Medications Ordered in Epic   Medication Dose Route Frequency Provider Last Rate Last Admin    lactated ringers infusion   Intravenous Continuous Kelechi Sanabria MD 20 mL/hr at 07/07/25 0738 New Bag at 07/07/25 0738     No current [facility]-ordered outpatient medications on file.    [5] No Known Allergies  [6]   Family History  Problem Relation Age of Onset    Pulmonary Disease Mother         COPD    Cancer Mother 68        cervix    Hypertension Maternal Grandmother     Breast Cancer Neg     Ovarian Cancer Neg    [7]   Social History  Socioeconomic History    Marital status: Legally    Tobacco Use    Smoking status: Never    Smokeless tobacco: Never   Vaping Use    Vaping status: Never Used   Substance and Sexual Activity    Alcohol use: Yes     Comment: very rare     Drug use: No    Sexual activity: Never     Partners: Male   Other Topics Concern    Caffeine Concern Yes     Comment: coffee, 2 cups/day

## 2025-07-07 NOTE — ANESTHESIA POSTPROCEDURE EVALUATION
Patient: Christine Francis    Procedure Summary       Date: 07/07/25 Room / Location: [facility] ENDOSCOPY 04 / [facility] ENDOSCOPY    Anesthesia Start: 0829 Anesthesia Stop:     Procedures:       COLONOSCOPY      ESOPHAGOGASTRODUODENOSCOPY (EGD) Diagnosis:       Gastroesophageal reflux disease, unspecified whether esophagitis present      Special screening for malignant neoplasms, colon      Iron deficiency anemia, unspecified iron deficiency anemia type      (poor prep, hemorroids)    Surgeons: Kelechi Sanabria MD Anesthesiologist: Doug Hopkins MD    Anesthesia Type: general ASA Status: 2            Anesthesia Type: general    Vitals Value Taken Time   BP 98/56 07/07/25 09:31   Temp  07/07/25 09:37   Pulse 81 07/07/25 09:36   Resp 15 07/07/25 09:36   SpO2 94 % 07/07/25 09:36   Vitals shown include unfiled device data.    [facility] AN Post Evaluation:   Patient Evaluated in PACU  Patient Participation: complete - patient participated  Level of Consciousness: awake  Pain Score: 0  Pain Management: adequate  Airway Patency:patent  Yes    Nausea/Vomiting: none  Cardiovascular Status: acceptable  Respiratory Status: acceptable  Postoperative Hydration acceptable    Patient was transported to pacu monitored and breathing room air, awake.VSS as above and report was given    Doug Hopkins MD  7/7/2025 9:37 AM

## 2025-07-09 ENCOUNTER — LAB ENCOUNTER (OUTPATIENT)
Dept: LAB | Age: 55
End: 2025-07-09
Attending: INTERNAL MEDICINE
Payer: COMMERCIAL

## 2025-07-09 DIAGNOSIS — R77.8 ABNORMAL SERUM PROTEIN ELECTROPHORESIS: ICD-10-CM

## 2025-07-09 DIAGNOSIS — D64.9 NORMOCHROMIC ANEMIA: ICD-10-CM

## 2025-07-09 LAB
ANION GAP SERPL CALC-SCNC: 10 MMOL/L (ref 0–18)
BASOPHILS # BLD AUTO: 0.12 X10(3) UL (ref 0–0.2)
BASOPHILS NFR BLD AUTO: 1.6 %
BUN BLD-MCNC: 16 MG/DL (ref 9–23)
BUN/CREAT SERPL: 10.1 (ref 10–20)
CALCIUM BLD-MCNC: 9.1 MG/DL (ref 8.7–10.4)
CHLORIDE SERPL-SCNC: 105 MMOL/L (ref 98–112)
CO2 SERPL-SCNC: 28 MMOL/L (ref 21–32)
CREAT BLD-MCNC: 1.58 MG/DL (ref 0.55–1.02)
DEPRECATED RDW RBC AUTO: 41.5 FL (ref 35.1–46.3)
EGFRCR SERPLBLD CKD-EPI 2021: 38 ML/MIN/1.73M2 (ref 60–?)
EOSINOPHIL # BLD AUTO: 0.44 X10(3) UL (ref 0–0.7)
EOSINOPHIL NFR BLD AUTO: 5.8 %
ERYTHROCYTE [DISTWIDTH] IN BLOOD BY AUTOMATED COUNT: 13 % (ref 11–15)
FASTING STATUS PATIENT QL REPORTED: YES
GLUCOSE BLD-MCNC: 73 MG/DL (ref 70–99)
HCT VFR BLD AUTO: 31.4 % (ref 35–48)
HGB BLD-MCNC: 9.9 G/DL (ref 12–16)
IMM GRANULOCYTES # BLD AUTO: 0.03 X10(3) UL (ref 0–1)
IMM GRANULOCYTES NFR BLD: 0.4 %
LDH SERPL L TO P-CCNC: 169 U/L (ref 120–246)
LYMPHOCYTES # BLD AUTO: 2.63 X10(3) UL (ref 1–4)
LYMPHOCYTES NFR BLD AUTO: 34.7 %
MCH RBC QN AUTO: 27.6 PG (ref 26–34)
MCHC RBC AUTO-ENTMCNC: 31.5 G/DL (ref 31–37)
MCV RBC AUTO: 87.5 FL (ref 80–100)
MONOCYTES # BLD AUTO: 0.67 X10(3) UL (ref 0.1–1)
MONOCYTES NFR BLD AUTO: 8.8 %
NEUTROPHILS # BLD AUTO: 3.69 X10 (3) UL (ref 1.5–7.7)
NEUTROPHILS # BLD AUTO: 3.69 X10(3) UL (ref 1.5–7.7)
NEUTROPHILS NFR BLD AUTO: 48.7 %
OSMOLALITY SERPL CALC.SUM OF ELEC: 296 MOSM/KG (ref 275–295)
PLATELET # BLD AUTO: 305 10(3)UL (ref 150–450)
POTASSIUM SERPL-SCNC: 3.9 MMOL/L (ref 3.5–5.1)
RBC # BLD AUTO: 3.59 X10(6)UL (ref 3.8–5.3)
SODIUM SERPL-SCNC: 143 MMOL/L (ref 136–145)
WBC # BLD AUTO: 7.6 X10(3) UL (ref 4–11)

## 2025-07-09 PROCEDURE — 86334 IMMUNOFIX E-PHORESIS SERUM: CPT

## 2025-07-09 PROCEDURE — 84165 PROTEIN E-PHORESIS SERUM: CPT

## 2025-07-09 PROCEDURE — 36415 COLL VENOUS BLD VENIPUNCTURE: CPT

## 2025-07-09 PROCEDURE — 83521 IG LIGHT CHAINS FREE EACH: CPT

## 2025-07-09 PROCEDURE — 83615 LACTATE (LD) (LDH) ENZYME: CPT

## 2025-07-09 PROCEDURE — 80048 BASIC METABOLIC PNL TOTAL CA: CPT

## 2025-07-09 PROCEDURE — 85025 COMPLETE CBC W/AUTO DIFF WBC: CPT

## 2025-07-11 LAB
ALBUMIN SERPL ELPH-MCNC: 3.14 G/DL (ref 3.75–5.21)
ALBUMIN/GLOB SERPL: 0.86 {RATIO} (ref 1–2)
ALPHA1 GLOB SERPL ELPH-MCNC: 0.27 G/DL (ref 0.19–0.46)
ALPHA2 GLOB SERPL ELPH-MCNC: 0.86 G/DL (ref 0.48–1.05)
B-GLOBULIN SERPL ELPH-MCNC: 1.84 G/DL (ref 0.68–1.23)
GAMMA GLOB SERPL ELPH-MCNC: 0.69 G/DL (ref 0.62–1.7)
KAPPA LC FREE SER-MCNC: 4.84 MG/DL (ref 0.33–1.94)
KAPPA LC FREE/LAMBDA FREE SER NEPH: 0.64 {RATIO} (ref 0.26–1.65)
LAMBDA LC FREE SERPL-MCNC: 7.52 MG/DL (ref 0.57–2.63)
PROT SERPL-MCNC: 6.8 G/DL (ref 5.7–8.2)

## 2025-07-14 ENCOUNTER — OFFICE VISIT (OUTPATIENT)
Age: 55
End: 2025-07-14
Attending: INTERNAL MEDICINE
Payer: COMMERCIAL

## 2025-07-14 ENCOUNTER — RESULTS FOLLOW-UP (OUTPATIENT)
Age: 55
End: 2025-07-14

## 2025-07-14 VITALS
BODY MASS INDEX: 36.6 KG/M2 | DIASTOLIC BLOOD PRESSURE: 79 MMHG | HEIGHT: 64 IN | OXYGEN SATURATION: 98 % | SYSTOLIC BLOOD PRESSURE: 134 MMHG | TEMPERATURE: 98 F | RESPIRATION RATE: 18 BRPM | WEIGHT: 214.38 LBS | HEART RATE: 76 BPM

## 2025-07-14 DIAGNOSIS — N18.32 STAGE 3B CHRONIC KIDNEY DISEASE (HCC): ICD-10-CM

## 2025-07-14 DIAGNOSIS — D64.9 NORMOCHROMIC ANEMIA: ICD-10-CM

## 2025-07-14 DIAGNOSIS — R77.8 ABNORMAL SERUM PROTEIN ELECTROPHORESIS: Primary | ICD-10-CM

## 2025-07-14 DIAGNOSIS — D47.2 MGUS (MONOCLONAL GAMMOPATHY OF UNKNOWN SIGNIFICANCE): ICD-10-CM

## 2025-07-14 NOTE — PROGRESS NOTES
[facility] Hematology/Oncology Consultation Note    Patient Name: Christine Francis   YOB: 1970   Medical Record Number: [id number]   [facility]: [id number]     Diagnosis  1. Abnormal serum protein electrophoresis    2. Normochromic anemia    3. MGUS (monoclonal gammopathy of unknown significance)      Current Therapy  Surveillance    INTERVAL HISTORY    Patient returns for follow-up. Since the last visit She has done well.  She underwent an EGD and colonoscopy 2 weeks ago that was benign.  H. pylori testing was negative.  Labs shows some persistent normocytic normochromic anemia.  She denies any bleeding, specifically hematuria, hematemesis or hemoptysis.    She saw nephrology and was recommended continued monitoring for her stage III CKD.  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  A 24-hour urine protein for quantification of Bence-Kim protein was recommended    Past Hematologic History   Christine Francis is a 54 year old female seen today in the Hematology Clinic  for an abnormal SPEP.  She had presented to her PCP for a regular physical and was found to have an elevated total protein.  This prompted a serum protein electropheresis that suggested abnormal band in the beta region however immunofixation was not performed.    CBC done on 12/24/24 showed WBC 9 K, platelet count of 360 K and Hemoglobin and Hematocrit of 11.4/36.2.  She does have some chronic kidney disease with a creatinine 1.7.  Calcium was normal at 10.4.  She has previously had anemia due to menorrhagia but this has since stopped     She was thought to have MGUS.  Given stable CKD and normal free light chain ratio appears to have low risk disease so on continued monitoring.       Past Medical History:  Past Medical History:    Anesthesia complication    Hard to wake up after Anesthesia    Arrhythmia    Cancer (HCC)    Recently diagnosed per patient    Esophageal reflux    Essential  hypertension    Fibroids    surgery    High blood pressure    Renal disorder    CKD stage 3B       Past Surgical History:  Past Surgical History:   Procedure Laterality Date          Colonoscopy N/A 2021    Procedure: COLONOSCOPY;  Surgeon: Kelechi Sanabria MD;  Location: [facility] ENDOSCOPY    Colonoscopy N/A 2025    Procedure: COLONOSCOPY;  Surgeon: Kelechi Sanabria MD;  Location: [facility] ENDOSCOPY    Other surgical history  2015    Root Canal Extraction       Family Medical History:  Family History   Problem Relation Age of Onset    Pulmonary Disease Mother         COPD    Cancer Mother 68        cervix    Hypertension Maternal Grandmother     Breast Cancer Neg     Ovarian Cancer Neg        Gyne History:  OB History    Para Term  AB Living   1 1 1 0 0 1   SAB IAB Ectopic Multiple Live Births   0 0 0 0 1       Social History:  Social History     Socioeconomic History    Marital status: Legally      Spouse name: Not on file    Number of children: Not on file    Years of education: Not on file    Highest education level: Not on file   Occupational History    Not on file   Tobacco Use    Smoking status: Never    Smokeless tobacco: Never   Vaping Use    Vaping status: Never Used   Substance and Sexual Activity    Alcohol use: Yes     Comment: very rare     Drug use: No    Sexual activity: Never     Partners: Male   Other Topics Concern     Service Not Asked    Blood Transfusions Not Asked    Caffeine Concern Yes     Comment: coffee, 2 cups/day    Occupational Exposure Not Asked    Hobby Hazards Not Asked    Sleep Concern Not Asked    Stress Concern Not Asked    Weight Concern Not Asked    Special Diet Not Asked    Back Care Not Asked    Exercise Not Asked    Bike Helmet Not Asked    Seat Belt Not Asked    Self-Exams Not Asked   Social History Narrative    Not on file     Social Drivers of Health     Food Insecurity: Not on file   Transportation Needs: Not on file   Housing  Stability: Low Risk  (7/6/2021)    Received from [facility]    Housing Stability     Mortgage Payment Concerns?: Not on file     Number of Places Lived in the Last Year: Not on file     Unstable Housing?: Not on file       Allergies:   Allergies[1]    Current Medications:    Current Outpatient Medications:     cetirizine 10 MG Oral Tab, Take 1 tablet (10 mg total) by mouth daily., Disp: , Rfl:     Multiple Vitamin (ONE-DAILY MULTI VITAMINS) Oral Tab, Take 1 tablet by mouth daily., Disp: , Rfl:     amLODIPine 10 MG Oral Tab, Take 0.5 tablets (5 mg total) by mouth daily., Disp: 90 tablet, Rfl: 2    fluticasone propionate 50 MCG/ACT Nasal Suspension, 2 sprays by Nasal route daily., Disp: 16 g, Rfl: 0    omeprazole 20 MG Oral Capsule Delayed Release, Take 1 capsule (20 mg total) by mouth every morning., Disp: 60 capsule, Rfl: 5    mupirocin 2 % External Ointment, Apply 1 Application topically 3 (three) times daily. (Patient not taking: No sig reported), Disp: 15 g, Rfl: 1    clotrimazole-betamethasone 1-0.05 % External Cream, Apply 1 Application  topically 2 (two) times daily as needed. (Patient not taking: Reported on 7/14/2025), Disp: 45 g, Rfl: 3    Review of Systems:  Pertinent positives and negatives noted in the the HPI.     Physical Examination:  /79 (BP Location: Left arm, Patient Position: Sitting, Cuff Size: large)   Pulse 76   Temp 97.5 °F (36.4 °C) (Tympanic)   Resp 18   Ht 1.626 m (5' 4\")   Wt 97.3 kg (214 lb 6.4 oz)   LMP 10/28/2020   SpO2 98%   BMI 36.80 kg/m²    General: Patient is alert and oriented x 3, not in acute distress.  HEENT: EOMs intact. PERRL. Oropharynx is clear.   Neck: No JVD. No palpable lymphadenopathy. Neck is supple.  Lymphatics: There is no palpable lymphadenopathy throughout in the cervical, supraclavicular, or axillary regions.  Chest: Clear to auscultation. No wheezes or rales.  Heart: Regular rate and rhythm. S1S2 normal.  Extremities: No edema or  calf tenderness.   Neurological: Grossly intact.       Labs:    Lab Results   Component Value Date/Time    WBC 7.6 07/09/2025 08:15 AM    RBC 3.59 (L) 07/09/2025 08:15 AM    HGB 9.9 (L) 07/09/2025 08:15 AM    HCT 31.4 (L) 07/09/2025 08:15 AM    MCV 87.5 07/09/2025 08:15 AM    MCH 27.6 07/09/2025 08:15 AM    MCHC 31.5 07/09/2025 08:15 AM    RDW 13.0 07/09/2025 08:15 AM    NEPRELIM 3.69 07/09/2025 08:15 AM    .0 07/09/2025 08:15 AM       Impression:  Diagnosis  1. Abnormal serum protein electrophoresis    2. Normochromic anemia    3. MGUS (monoclonal gammopathy of unknown significance)    54-year-old female recently found to have elevated total protein.  SPEP suggested an abnormal band in the beta region with subsequent immunofixation showing an IgG paraprotein.  Normal free light chain ratio.  She also has some chronic kidney disease but no significant anemia or hypercalcemia.  Thought to have low risk disease so on surveillance.    Plan:  MGUS: Repeat SPEP again shows elevated beta fraction with inability to quantify the monoclonal protein.  Her renal function is stable as is her anemia so we will continue monitoring again in 6 months.  Needs 24-hour UPEP to quantify her Bence-Kim proteinuria.  Anemia: mild, normocytic/normochromic, ?from CKD, check iron studies.  If low will supplement otherwise repeat CBC and reticulocyte count in 3 months    Thank you OFELIA Mendiola for the opportunity to participate in the care of this interesting patient. Please do contact me if I may be of any further assistance    Martínez Celeste MD  [facility]            [1] No Known Allergies

## 2025-07-15 DIAGNOSIS — D64.9 NORMOCHROMIC ANEMIA: ICD-10-CM

## 2025-07-15 DIAGNOSIS — R77.8 ABNORMAL SERUM PROTEIN ELECTROPHORESIS: ICD-10-CM

## 2025-07-15 DIAGNOSIS — D47.2 MGUS (MONOCLONAL GAMMOPATHY OF UNKNOWN SIGNIFICANCE): ICD-10-CM

## 2025-07-15 RX ORDER — FERROUS SULFATE 325(65) MG
325 TABLET ORAL
Qty: 30 TABLET | Refills: 1 | Status: SHIPPED | OUTPATIENT
Start: 2025-07-15

## 2025-07-15 NOTE — TELEPHONE ENCOUNTER
Called Reymundo, per Dr. Celeste, discussed the lab results, Iron is borderline, suggest oral ferrous sulfate 325 mg once daily, sent to her pharmacy. Also, told her that he ordered 24 hour urine study. She will  the required jug from [facility] for the test. She is aware of the need for outpatient labs prior to her follow up in January. She  thanked me for the call.

## 2025-07-25 ENCOUNTER — TELEPHONE (OUTPATIENT)
Facility: CLINIC | Age: 55
End: 2025-07-25

## 2025-07-25 RX ORDER — AMLODIPINE BESYLATE 10 MG/1
5 TABLET ORAL DAILY
Qty: 90 TABLET | Refills: 2 | Status: CANCELLED | OUTPATIENT
Start: 2025-07-25

## 2025-07-25 NOTE — TELEPHONE ENCOUNTER
Health maintenance updated.    1 year colonoscopy recall entered into patient outreach in [facility].  Next colonoscopy will be due 7/7/2026.

## 2025-07-28 RX ORDER — AMLODIPINE BESYLATE 5 MG/1
5 TABLET ORAL DAILY
Qty: 90 TABLET | Refills: 0 | OUTPATIENT
Start: 2025-07-28

## 2025-07-28 NOTE — TELEPHONE ENCOUNTER
Mobile[employer] message sent to patient 7/28/25- 15 month supply sent to [facility] 5/3/25.    Denied refill request sent from pharmacy 7/25/25.  Pharmacy and Packet Digital[employer] request sent  Refill refused, patient has refills available at the pharmacy.

## 2025-07-29 ENCOUNTER — PATIENT MESSAGE (OUTPATIENT)
Dept: INTERNAL MEDICINE CLINIC | Facility: CLINIC | Age: 55
End: 2025-07-29

## 2025-07-30 RX ORDER — AMLODIPINE BESYLATE 10 MG/1
5 TABLET ORAL DAILY
Qty: 45 TABLET | Refills: 3 | Status: SHIPPED | OUTPATIENT
Start: 2025-07-30

## 2025-07-31 ENCOUNTER — LAB ENCOUNTER (OUTPATIENT)
Dept: LAB | Facility: HOSPITAL | Age: 55
End: 2025-07-31
Attending: INTERNAL MEDICINE

## 2025-07-31 DIAGNOSIS — N18.32 STAGE 3B CHRONIC KIDNEY DISEASE (HCC): ICD-10-CM

## 2025-07-31 LAB
ALBUMIN SERPL-MCNC: 4 G/DL (ref 3.2–4.8)
ANION GAP SERPL CALC-SCNC: 9 MMOL/L (ref 0–18)
BASOPHILS # BLD AUTO: 0.08 X10(3) UL (ref 0–0.2)
BASOPHILS NFR BLD AUTO: 1.1 %
BUN BLD-MCNC: 17 MG/DL (ref 9–23)
BUN/CREAT SERPL: 10.7 (ref 10–20)
CALCIUM BLD-MCNC: 9.7 MG/DL (ref 8.7–10.4)
CHLORIDE SERPL-SCNC: 104 MMOL/L (ref 98–112)
CO2 SERPL-SCNC: 28 MMOL/L (ref 21–32)
CREAT BLD-MCNC: 1.59 MG/DL (ref 0.55–1.02)
DEPRECATED RDW RBC AUTO: 43.2 FL (ref 35.1–46.3)
EGFRCR SERPLBLD CKD-EPI 2021: 38 ML/MIN/1.73M2 (ref 60–?)
EOSINOPHIL # BLD AUTO: 0.32 X10(3) UL (ref 0–0.7)
EOSINOPHIL NFR BLD AUTO: 4.2 %
ERYTHROCYTE [DISTWIDTH] IN BLOOD BY AUTOMATED COUNT: 13.3 % (ref 11–15)
GLUCOSE BLD-MCNC: 81 MG/DL (ref 70–99)
HCT VFR BLD AUTO: 33.1 % (ref 35–48)
HGB BLD-MCNC: 10.4 G/DL (ref 12–16)
IMM GRANULOCYTES # BLD AUTO: 0.02 X10(3) UL (ref 0–1)
IMM GRANULOCYTES NFR BLD: 0.3 %
LYMPHOCYTES # BLD AUTO: 2.05 X10(3) UL (ref 1–4)
LYMPHOCYTES NFR BLD AUTO: 27.2 %
MCH RBC QN AUTO: 27.7 PG (ref 26–34)
MCHC RBC AUTO-ENTMCNC: 31.4 G/DL (ref 31–37)
MCV RBC AUTO: 88.3 FL (ref 80–100)
MONOCYTES # BLD AUTO: 0.63 X10(3) UL (ref 0.1–1)
MONOCYTES NFR BLD AUTO: 8.4 %
NEUTROPHILS # BLD AUTO: 4.44 X10 (3) UL (ref 1.5–7.7)
NEUTROPHILS # BLD AUTO: 4.44 X10(3) UL (ref 1.5–7.7)
NEUTROPHILS NFR BLD AUTO: 58.8 %
OSMOLALITY SERPL CALC.SUM OF ELEC: 293 MOSM/KG (ref 275–295)
PHOSPHATE SERPL-MCNC: 4 MG/DL (ref 2.4–5.1)
PLATELET # BLD AUTO: 301 10(3)UL (ref 150–450)
POTASSIUM SERPL-SCNC: 4.2 MMOL/L (ref 3.5–5.1)
RBC # BLD AUTO: 3.75 X10(6)UL (ref 3.8–5.3)
SODIUM SERPL-SCNC: 141 MMOL/L (ref 136–145)
WBC # BLD AUTO: 7.5 X10(3) UL (ref 4–11)

## 2025-07-31 PROCEDURE — 36415 COLL VENOUS BLD VENIPUNCTURE: CPT

## 2025-07-31 PROCEDURE — 85025 COMPLETE CBC W/AUTO DIFF WBC: CPT

## 2025-07-31 PROCEDURE — 80069 RENAL FUNCTION PANEL: CPT

## 2025-08-04 ENCOUNTER — HOSPITAL ENCOUNTER (OUTPATIENT)
Dept: MAMMOGRAPHY | Facility: HOSPITAL | Age: 55
Discharge: HOME | End: 2025-08-04
Attending: NURSE PRACTITIONER

## 2025-08-04 ENCOUNTER — HOSPITAL ENCOUNTER (OUTPATIENT)
Dept: ULTRASOUND IMAGING | Facility: HOSPITAL | Age: 55
Discharge: HOME | End: 2025-08-04
Attending: NURSE PRACTITIONER

## 2025-08-04 ENCOUNTER — LAB ENCOUNTER (OUTPATIENT)
Dept: LAB | Facility: HOSPITAL | Age: 55
End: 2025-08-04
Attending: NURSE PRACTITIONER

## 2025-08-04 DIAGNOSIS — R92.8 ABNORMALITY OF BREAST ON SCREENING MAMMOGRAPHY: ICD-10-CM

## 2025-08-04 DIAGNOSIS — N18.32 STAGE 3B CHRONIC KIDNEY DISEASE (HCC): ICD-10-CM

## 2025-08-04 DIAGNOSIS — D47.2 MGUS (MONOCLONAL GAMMOPATHY OF UNKNOWN SIGNIFICANCE): ICD-10-CM

## 2025-08-04 DIAGNOSIS — Z12.31 ENCOUNTER FOR SCREENING MAMMOGRAM FOR MALIGNANT NEOPLASM OF BREAST: ICD-10-CM

## 2025-08-04 DIAGNOSIS — R92.8 ABNORMALITY OF BREAST ON SCREENING MAMMOGRAPHY: Primary | ICD-10-CM

## 2025-08-04 LAB
M PROTEIN 24H UR ELPH-MRATE: 696 MG/24 HR (ref ?–149.1)
SPECIMEN VOL UR: 1600 ML

## 2025-08-04 PROCEDURE — 77065 DX MAMMO INCL CAD UNI: CPT | Performed by: NURSE PRACTITIONER

## 2025-08-04 PROCEDURE — 77061 BREAST TOMOSYNTHESIS UNI: CPT | Performed by: NURSE PRACTITIONER

## 2025-08-04 PROCEDURE — 86335 IMMUNFIX E-PHORSIS/URINE/CSF: CPT

## 2025-08-04 PROCEDURE — 84166 PROTEIN E-PHORESIS/URINE/CSF: CPT

## 2025-08-04 PROCEDURE — 76642 ULTRASOUND BREAST LIMITED: CPT | Performed by: NURSE PRACTITIONER

## 2025-08-04 PROCEDURE — 84156 ASSAY OF PROTEIN URINE: CPT

## (undated) DEVICE — Device: Brand: CUSTOM PROCEDURE KIT

## (undated) DEVICE — MEDI-VAC NON-CONDUCTIVE SUCTION TUBING: Brand: CARDINAL HEALTH

## (undated) DEVICE — 35 ML SYRINGE REGULAR TIP: Brand: MONOJECT

## (undated) DEVICE — KIT CLEAN ENDOKIT 1.1OZ GOWNX2

## (undated) DEVICE — CONMED SCOPE SAVER BITE BLOCK, 20X27 MM: Brand: SCOPE SAVER

## (undated) DEVICE — MEDI-VAC NON-CONDUCTIVE SUCTION TUBING 6MM X 1.8M (6FT.) L: Brand: CARDINAL HEALTH

## (undated) DEVICE — Device: Brand: DEFENDO AIR/WATER/SUCTION AND BIOPSY VALVE

## (undated) DEVICE — 60 ML SYRINGE REGULAR TIP: Brand: MONOJECT

## (undated) DEVICE — CLIP LGT 11MM OPEN 2.8MM 235CM

## (undated) DEVICE — KIT ENDO ORCAPOD 160/180/190

## (undated) DEVICE — Device

## (undated) DEVICE — STERILE LATEX POWDER-FREE SURGICAL GLOVESWITH NITRILE COATING: Brand: PROTEXIS

## (undated) DEVICE — LINE MNTR ADLT SET O2 INTMD

## (undated) DEVICE — GIJAW SINGLE-USE BIOPSY FORCEPS WITH NEEDLE: Brand: GIJAW

## (undated) DEVICE — V2 SPECIMEN COLLECTION MANIFOLD KIT: Brand: NEPTUNE

## (undated) DEVICE — YANKAUER,BULB TIP,W/O VENT,RIGID,STERILE: Brand: MEDLINE

## (undated) DEVICE — HEXAGONAL CAPTIVATOR STIFF 13M

## (undated) DEVICE — FORCEP RADIAL JAW 4

## (undated) NOTE — LETTER
Bess Farah, Aprn  [address] [place] [address]       25        Patient: Christine Francis   YOB: 1970   Date of Visit: 2025       Dear  Dr. Maddi MD,      Thank you for referring Christine Francis to my practice.  Please find my assessment and plan below.    As you know she is a 54-year-old female with a history of hypertension who I now had the pleasure of seeing for what may be chronic kidney disease stage III.  Laboratory studies have been reviewed in care everywhere and in epic.  Back on 2020 when she had normal creatinine 0.99.  There is a gap until 2023 when her creatinine is 1.39.  She then had labs done on 2024 where creatinine crept up to 1.73 but then dropped down to 1.49 on 2025 with an estimated GFR of 41 cc/min.  Patient did have an asymptomatic UTI in 2024.    Her past medical history is significant for hypertension for at least 10 years.  Currently being followed by hematology for possible MGUS.  She states she has otherwise been in general good health.   1 para 1 in pregnancy was uneventful with no hypertension or proteinuria.  No history of recurrent UTIs.  She does check her blood pressures regularly at home and usually systolics in the 120s with diastolics in the 70s.  Medications are as listed.  Denies any significant use of nonsteroidals.    Social history non-smoker.  Family history she did have an aunt who was on dialysis.  Further details unclear.  Review of system patient was states she has been doing well without any chest pain, shortness of breath, GI or urinary tract symptoms.  Did have chronic cough which did get better when starting omeprazole in January of this year.    On physical exam her blood pressure is 136/76 with a pulse of 80 when she weighed 222 pounds.  Her neck was supple without JVD.  Lungs are clear.  Heart revealed a regular rate and  rhythm with an S4 but no gallops, murmurs or rubs.  Abdomen was soft, flat, nontender without organomegaly, masses or bruits.  Extremities revealed no clubbing, cyanosis or edema.    I therefore informed the patient that she may have chronic kidney disease stage III.  This may be secondary to hypertension but other causes need to be excluded.  For completion sake we will repeat a CBC, renal panel, urinalysis, urine for microalbumin, urine for Bence-Kim protein, sed rate, connective tissue profile, phospholipase, and a renal ultrasound have been ordered.  Maintain adequate hydration.  Avoid nonsteroidals.  Further impressions and recommendations will be forthcoming after reviewing the above.    Thank you very much for allowing me to participate in the care of your patient.  If you have any questions please were free to call.               Sincerely,   Eulogio Krause MD   [facility] MEDICAL [facility], [facility], [place]  [address]  [facility] [address]    Document electronically generated by:  Eulogio Krause MD

## (undated) NOTE — LETTER
No referring provider defined for this encounter.       06/06/25        Patient: Christine Francis   YOB: 1970   Date of Visit: 6/6/2025       Dear  Dr. Maddi MD,      Thank you for referring Christine Francis to my practice.  Please find my assessment and plan below.      As you know she is a 54-year-old female with a history of hypertension and possibly MGUS followed by hematology who I now had the pleasure of seeing for follow-up of chronic kidney disease stage III.  The patient just underwent a recent renal evaluation.  Her GAVI was borderline at 1-80.  Other serological tests though were nonrevealing making this a false positive GAVI.  Urinalysis was unremarkable except for mild proteinuria with a urine microalbumin to creatinine ratio 318.  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  Her repeat creatinine on March 10, 2025 was stable at 1.50 with an estimated GFR of 41 cc/min.  However renal ultrasound done on March 12, 2025 suggested the possibility of a 1.9 cm renal mass in the right kidney versus a parenchymal lobulation.  As result an MRI of the abdomen was done on May 25, 2025.  This revealed fetal lobulations but otherwise no suspicious masses were seen.    Finally she had a follow-up blood test done on May 1, 2025 ordered by Bess Farah.  For some reason her creatinine though it crept up to 1.67 now with an estimated GFR 36 cc/min.  The patient states she may have been sick at that time.    I therefore informed the patient that she does have chronic kidney disease stage III most likely secondary to hypertension and nephrosclerosis.  She checks her blood pressures regularly at home and they are usually in the 130 over 70s range.  Her blood pressure today here in the office 150/79.  Repeat was 142/80.  She will continue to monitor blood pressures regularly.  Reinforced importance of maintaining adequate hydration.  Avoid nonsteroidals.  Will have her  repeat a CBC and renal panel now at to see if that increase in her creatinine persists.  Otherwise if doing well we will have her continue to do CBC and renal panels quarterly.  I will see her again in 6 months for follow-up or sooner if clinically indicated.  Follow-up with hematology as they advise.    Thank you again for allowing me to participate in the care of your patient.  If you have any questions please feel free to call.           Sincerely,   Eulogio Krause MD   [facility], [facility], [place]  [address] [address]    Document electronically generated by:  Eulogio Krause MD

## (undated) NOTE — LETTER
[facility]  [address], [place], [place]    Authorization for Surgical Operation and Procedure                               I hereby authorize Kelechi Sanabria MD, my physician and his/her assistants (if applicable), which may include medical students, residents, and/or fellows, to perform the following surgical operation/ procedure and administer such anesthesia as may be determined necessary by my physician: Operation/Procedure name (s) COLONOSCOPY/ ESOPHAGOGASTRODUODENOSCOPY on Christine Francis   2.   I recognize that during the surgical operation/procedure, unforeseen conditions may necessitate additional or different procedures than those listed above.  I, therefore, further authorize and request that the above-named surgeon, assistants, or designees perform such procedures as are, in their judgment, necessary and desirable.    3.   My surgeon/physician has discussed prior to my surgery the potential benefits, risks and side effects of this procedure; the likelihood of achieving goals; and potential problems that might occur during recuperation.  They also discussed reasonable alternatives to the procedure, including risks, benefits, and side effects related to the alternatives and risks related to not receiving this procedure.  I have had all my questions answered and I acknowledge that no guarantee has been made as to the result that may be obtained.    4.   Should the need arise during my operation/procedure, which includes change of level of care prior to discharge, I also consent to the administration of blood and/or blood products.  Further, I understand that despite careful testing and screening of blood or blood products by collecting agencies, I may still be subject to ill effects as a result of receiving a blood transfusion and/or blood products.  The following are some, but not all, of the potential risks that can occur: fever and allergic reactions, hemolytic  reactions, transmission of diseases such as Hepatitis, AIDS and Cytomegalovirus (CMV) and fluid overload.  In the event that I wish to have an autologous transfusion of my own blood, or a directed donor transfusion, I will discuss this with my physician.  Check only if Refusing Blood or Blood Products  I understand refusal of blood or blood products as deemed necessary by my physician may have serious consequences to my condition to include possible death. I hereby assume responsibility for my refusal and release the hospital, its personnel, and my physicians from any responsibility for the consequences of my refusal.    o  Refuse   5.   I authorize the use of any specimen, organs, tissues, body parts or foreign objects that may be removed from my body during the operation/procedure for diagnosis, research or teaching purposes and their subsequent disposal by hospital authorities.  I also authorize the release of specimen test results and/or written reports to my treating physician on the hospital medical staff or other referring or consulting physicians involved in my care, at the discretion of the Pathologist or my treating physician.    6.   I consent to the photographing or videotaping of the operations or procedures to be performed, including appropriate portions of my body for medical, scientific, or educational purposes, provided my identity is not revealed by the pictures or by descriptive texts accompanying them.  If the procedure has been photographed/videotaped, the surgeon will obtain the original picture, image, videotape or CD.  The hospital will not be responsible for storage, release or maintenance of the picture, image, tape or CD.    7.   I consent to the presence of a  or observers in the operating room as deemed necessary by my physician or their designees.    8.   I recognize that in the event my procedure results in extended X-Ray/fluoroscopy time, I may develop a skin  reaction.    9. If I have a Do Not Attempt Resuscitation (DNAR) order in place, that status will be suspended while in the operating room, procedural suite, and during the recovery period unless otherwise explicitly stated by me (or a person authorized to consent on my behalf). The surgeon or my attending physician will determine when the applicable recovery period ends for purposes of reinstating the DNAR order.  10. Patients having a sterilization procedure: I understand that if the procedure is successful the results will be permanent and it will therefore be impossible for me to inseminate, conceive, or bear children.  I also understand that the procedure is intended to result in sterility, although the result has not been guaranteed.   11. I acknowledge that my physician has explained sedation/analgesia administration to me including the risk and benefits I consent to the administration of sedation/analgesia as may be necessary or desirable in the judgment of my physician.    I CERTIFY THAT I HAVE READ AND FULLY UNDERSTAND THE ABOVE CONSENT TO OPERATION and/or OTHER PROCEDURE.     ____________________________________  _________________________________        ______________________________  Signature of Patient    Signature of Responsible Person                Printed Name of Responsible Person                                      ____________________________________  _____________________________                ________________________________  Signature of Witness        Date  Time         Relationship to Patient    STATEMENT OF PHYSICIAN My signature below affirms that prior to the time of the procedure; I have explained to the patient and/or his/her legal representative, the risks and benefits involved in the proposed treatment and any reasonable alternative to the proposed treatment. I have also explained the risks and benefits involved in refusal of the proposed treatment and alternatives to the proposed  treatment and have answered the patient's questions. If I have a significant financial interest in a co-management agreement or a significant financial interest in any product or implant, or other significant relationship used in this procedure/surgery, I have disclosed this and had a discussion with my patient.     _____________________________________________________              _____________________________  (Signature of Physician)                                                                                         (Date)                                   (Time)  Patient Name: Christine Chacon Tito      : 1970      Printed: 2025     Medical Record #: [id number]                                      Page 1 of 1

## (undated) NOTE — LETTER
01/12/21        [address]       Dear Dallin Summers,    [facility] records indicate that you have outstanding lab work and or testing that was ordered for you and has not yet been completed:     [facility] (

## (undated) NOTE — LETTER
Patient Name: Marlon Cohen  : 1970  MRN: [id number]  Patient Address: [address] [place] [address]      Coronavirus Disease 2019 (COVID-19)     [facility] is committed to the safety and well-being of our patients, me 2. Monitor your symptoms carefully. If your symptoms get worse, call your healthcare provider immediately. 3. Get rest and stay hydrated.    4. If you have a medical appointment, call the healthcare provider ahead of time and tell them that you have or may ? At least 24 hours have passed since recovery defined as resolution of fever without the use of fever-reducing medications; and  · Improvement in respiratory symptoms (e.g., cough, shortness of breath); and  · At least 10 days have passed since symptoms f If you would be interested in donating your plasma to help treat others diagnosed with the virus, please contact [facility] directly on their website: [email or web address]    Who is eligible to donate convalescent plasma?

## (undated) NOTE — LETTER
[facility]  Administration of Anesthesia  RAÚLReymundosharifa Yanique Francis agree to be cared for by a physician anesthesiologist alone and/or with a nurse anesthetist, who is specially trained to monitor me and give me medicine to put me to sleep or keep me comfortable during my procedure    I understand that my anesthesiologist and/or anesthetist is not an employee or agent of [facility] or [employer]. He or she works for [facility]    As the patient asking for anesthesia services, I agree to:  Allow the anesthesiologist (anesthesia doctor) to give me medicine and do additional procedures as necessary. Some examples are: Starting or using an “IV” to give me medicine, fluids or blood during my procedure, and having a breathing tube placed to help me breathe when I’m asleep (intubation). In the event that my heart stops working properly, I understand that my anesthesiologist will make every effort to sustain my life, unless otherwise directed by [facility] Do Not Resuscitate documents.  Tell my anesthesia doctor before my procedure:  If I am pregnant.  The last time that I ate or drank.  iii. All of the medicines I take (including prescriptions, herbal supplements, and pills I can buy without a prescription (including street drugs/illegal medications). Failure to inform my anesthesiologist about these medicines may increase my risk of anesthetic complications.  iv.If I am allergic to anything or have had a reaction to anesthesia before.  I understand how the anesthesia medicine will help me (benefits).  I understand that with any type of anesthesia medicine there are risks:  The most common risks are: nausea, vomiting, sore throat, muscle soreness, damage to my eyes, mouth, or teeth (from breathing tube placement).  Rare risks include: remembering what happened during my procedure, allergic reactions to medications, injury to my airway, heart, lungs, vision,  nerves, or muscles and in extremely rare instances death.  My doctor has explained to me other choices available to me for my care (alternatives).  Pregnant Patients (“epidural”):  I understand that the risks of having an epidural (medicine given into my back to help control pain during labor), include itching, low blood pressure, difficulty urinating, headache or slowing of the baby’s heart. Very rare risks include infection, bleeding, seizure, irregular heart rhythms and nerve injury.  Regional Anesthesia (“spinal”, “epidural”, & “nerve blocks”):  I understand that rare but potential complications include headache, bleeding, infection, seizure, irregular heart rhythms, and nerve injury.    _____________________________________________________________________________  Patient (or Representative) Signature/Relationship to Patient  Date   Time    _____________________________________________________________________________   Name (if used)    Language/Organization   Time    _____________________________________________________________________________  Nurse Anesthetist Signature     Date   Time  _____________________________________________________________________________  Anesthesiologist Signature     Date   Time  I have discussed the procedure and information above with the patient (or patient’s representative) and answered their questions. The patient or their representative has agreed to have anesthesia services.    _____________________________________________________________________________  Witness        Date   Time  I have verified that the signature is that of the patient or patient’s representative, and that it was signed before the procedure  Patient Name: Christine Francis     : 1970                 Printed: 2025 at 12:31 PM    Medical Record #: [id number]                                            Page 1 of 1  ----------ANESTHESIA CONSENT----------

## (undated) NOTE — LETTER
Date & Time: 2/6/2025, 11:38 AM  Patient: Chrisitne Francis  Encounter Provider(s):    Nuvia Madrigal APRN       To Whom It May Concern:    Christine Francis was seen and treated in our department on 2/6/2025. She should not return to work until 02/10/2025 .    If you have any questions or concerns, please do not hesitate to call.        _____________________________  Physician/APC Signature

## (undated) NOTE — LETTER
AUTHORIZATION FOR SURGICAL OPERATION OR OTHER PROCEDURE    1. I hereby authorize Dr. Noel Vicente  and [facility] staff assigned to my case to perform the following operation and/or procedure at the [facility]:    Cortisone injection in Left knee   _______________________________________________________________________________________________      _______________________________________________________________________________________________    2. My physician has explained the nature and purpose of the operation or other procedure, possible alternative methods of treatment, the risks involved, and the possibility of complication to me. I acknowledge that no guarantee has been made as to the result that may be obtained. 3.  I recognize that, during the course of this operation, or other procedure, unforseen conditions may necessitate additional or different procedure than those listed above. I, therefore, further authorize and request that the above named physician, his/her physician assistants or designees perform such procedures as are, in his/her professional opinion, necessary and desirable. 4.  Any tissue or organs removed in the operation or other procedure may be disposed of by and at the discretion of the [facility] and [facility] AT [facility]. 5.  I understand that in the event of a medical emergency, I will be transported by local paramedics to [facility] or other hospital emergency department. 6.  I certify that I have read and fully understand the above consent to operation and/or other procedure. 7.  I acknowledge that my physician has explained sedation/analgesia administration to me including the risks and benefits. I consent to the administration of sedation/analgesia as may be necessary or desirable in the judgement of my physician.     Witness signature: ___________________________________________________ Date:  ______/______/_____ Time:  ________ A. M.  P.M. Patient Name:  ______________________________________________________  (please print)      Patient signature:  ___________________________________________________             Relationship to Patient:           []  Parent    Responsible person                          []  Spouse  In case of minor or                    [] Other  _____________   Incompetent name:  __________________________________________________                               (please print)      _____________      Responsible person  In case of minor or  Incompetent signature:  _______________________________________________    Statement of Physician  My signature below affirms that prior to the time of the procedure, I have explained to the patient and/or his/her guardian, the risks and benefits involved in the proposed treatment and any reasonable alternative to the proposed treatment. I have also explained the risks and benefits involved in the refusal of the proposed treatment and have answered the patient's questions.                         Date:  ______/______/_______  Provider                      Signature:  __________________________________________________________       Time:  ___________ A.M    P.M.

## (undated) NOTE — LETTER
[facility]  [address] [facility] [id number] [phone]       Tad Batista      This is the [place] [address] clinic, office of Dr. Maria T Salmon    Thank you for putting your trust in [place] [address]. Our goal is to deliver the highest quality healthcare and an exceptional patient experience. Upon reviewing of your medical record shows you are due for the following:       Annual Physical   Mammogram ( due 12/29/23)          Please call [phone] to schedule your appointment or schedule online via [employer]. If you changed to a new provider at another facility, please notify the clinic to update your records. If you had any recent testing at another facility, please have your results faxed to our office at [phone]. Thank you and have a great day!

## (undated) NOTE — LETTER
09/05/18        [facility]      Dear Good Wolff,    [facility] records indicate that you have outstanding lab work and or testing that was ordered for you and has not yet been completed:          CBC W Differential W P